# Patient Record
Sex: FEMALE | Race: WHITE | URBAN - METROPOLITAN AREA
[De-identification: names, ages, dates, MRNs, and addresses within clinical notes are randomized per-mention and may not be internally consistent; named-entity substitution may affect disease eponyms.]

---

## 2017-03-27 ENCOUNTER — OFFICE VISIT (OUTPATIENT)
Dept: RHEUMATOLOGY | Age: 32
End: 2017-03-27

## 2017-03-27 VITALS
DIASTOLIC BLOOD PRESSURE: 64 MMHG | SYSTOLIC BLOOD PRESSURE: 114 MMHG | TEMPERATURE: 98.4 F | BODY MASS INDEX: 33.31 KG/M2 | HEIGHT: 62 IN | WEIGHT: 181 LBS | HEART RATE: 67 BPM

## 2017-03-27 DIAGNOSIS — Z51.11 MAINTENANCE CHEMOTHERAPY: ICD-10-CM

## 2017-03-27 DIAGNOSIS — M06.09 RHEUMATOID ARTHRITIS OF MULTIPLE SITES WITHOUT RHEUMATOID FACTOR (HCC): Primary | ICD-10-CM

## 2017-03-27 DIAGNOSIS — M06.09 RHEUMATOID ARTHRITIS OF MULTIPLE SITES WITHOUT RHEUMATOID FACTOR (HCC): ICD-10-CM

## 2017-03-27 LAB
A/G RATIO: 1.4 (ref 1.1–2.2)
ALBUMIN SERPL-MCNC: 4.3 G/DL (ref 3.4–5)
ALP BLD-CCNC: 48 U/L (ref 40–129)
ALT SERPL-CCNC: 17 U/L (ref 10–40)
ANION GAP SERPL CALCULATED.3IONS-SCNC: 19 MMOL/L (ref 3–16)
AST SERPL-CCNC: 16 U/L (ref 15–37)
BASOPHILS ABSOLUTE: 0 K/UL (ref 0–0.2)
BASOPHILS RELATIVE PERCENT: 0.4 %
BILIRUB SERPL-MCNC: 0.3 MG/DL (ref 0–1)
BUN BLDV-MCNC: 14 MG/DL (ref 7–20)
C-REACTIVE PROTEIN: 10.1 MG/L (ref 0–5.1)
CALCIUM SERPL-MCNC: 9.7 MG/DL (ref 8.3–10.6)
CHLORIDE BLD-SCNC: 100 MMOL/L (ref 99–110)
CO2: 20 MMOL/L (ref 21–32)
CREAT SERPL-MCNC: 0.6 MG/DL (ref 0.6–1.1)
EOSINOPHILS ABSOLUTE: 0.1 K/UL (ref 0–0.6)
EOSINOPHILS RELATIVE PERCENT: 1.4 %
GFR AFRICAN AMERICAN: >60
GFR NON-AFRICAN AMERICAN: >60
GLOBULIN: 3 G/DL
GLUCOSE BLD-MCNC: 74 MG/DL (ref 70–99)
HCT VFR BLD CALC: 42.5 % (ref 36–48)
HEMOGLOBIN: 13.9 G/DL (ref 12–16)
HEPATITIS B CORE IGM ANTIBODY: NORMAL
HEPATITIS B SURFACE ANTIGEN INTERPRETATION: NORMAL
HEPATITIS C ANTIBODY INTERPRETATION: NORMAL
LYMPHOCYTES ABSOLUTE: 2.5 K/UL (ref 1–5.1)
LYMPHOCYTES RELATIVE PERCENT: 31.7 %
MCH RBC QN AUTO: 30.4 PG (ref 26–34)
MCHC RBC AUTO-ENTMCNC: 32.7 G/DL (ref 31–36)
MCV RBC AUTO: 93.2 FL (ref 80–100)
MONOCYTES ABSOLUTE: 0.2 K/UL (ref 0–1.3)
MONOCYTES RELATIVE PERCENT: 3.1 %
NEUTROPHILS ABSOLUTE: 5.1 K/UL (ref 1.7–7.7)
NEUTROPHILS RELATIVE PERCENT: 63.4 %
PDW BLD-RTO: 14.1 % (ref 12.4–15.4)
PLATELET # BLD: 329 K/UL (ref 135–450)
PMV BLD AUTO: 9.1 FL (ref 5–10.5)
POTASSIUM SERPL-SCNC: 4.3 MMOL/L (ref 3.5–5.1)
RBC # BLD: 4.56 M/UL (ref 4–5.2)
RHEUMATOID FACTOR: 23 IU/ML
SEDIMENTATION RATE, ERYTHROCYTE: 32 MM/HR (ref 0–20)
SODIUM BLD-SCNC: 139 MMOL/L (ref 136–145)
TOTAL PROTEIN: 7.3 G/DL (ref 6.4–8.2)
WBC # BLD: 8 K/UL (ref 4–11)

## 2017-03-27 PROCEDURE — 99215 OFFICE O/P EST HI 40 MIN: CPT | Performed by: INTERNAL MEDICINE

## 2017-03-29 LAB — CCP IGG ANTIBODIES: 135 UNITS (ref 0–19)

## 2017-03-31 LAB
QUANTIFERON (R) TB GOLD (INCUBATED): NEGATIVE
QUANTIFERON MITOGEN: 9.75 IU/ML
QUANTIFERON NIL: 0.06 IU/ML
QUANTIFERON TB AG MINUS NIL: 0 IU/ML (ref 0–0.34)

## 2017-07-07 ENCOUNTER — OFFICE VISIT (OUTPATIENT)
Dept: RHEUMATOLOGY | Age: 32
End: 2017-07-07

## 2017-07-07 VITALS
WEIGHT: 188 LBS | DIASTOLIC BLOOD PRESSURE: 76 MMHG | OXYGEN SATURATION: 98 % | HEART RATE: 88 BPM | BODY MASS INDEX: 34.39 KG/M2 | SYSTOLIC BLOOD PRESSURE: 128 MMHG

## 2017-07-07 DIAGNOSIS — Z51.11 MAINTENANCE CHEMOTHERAPY: ICD-10-CM

## 2017-07-07 DIAGNOSIS — M05.79 RHEUMATOID ARTHRITIS INVOLVING MULTIPLE SITES WITH POSITIVE RHEUMATOID FACTOR (HCC): ICD-10-CM

## 2017-07-07 DIAGNOSIS — M05.79 RHEUMATOID ARTHRITIS INVOLVING MULTIPLE SITES WITH POSITIVE RHEUMATOID FACTOR (HCC): Primary | ICD-10-CM

## 2017-07-07 LAB
A/G RATIO: 1.3 (ref 1.1–2.2)
ALBUMIN SERPL-MCNC: 4.3 G/DL (ref 3.4–5)
ALP BLD-CCNC: 50 U/L (ref 40–129)
ALT SERPL-CCNC: 13 U/L (ref 10–40)
ANION GAP SERPL CALCULATED.3IONS-SCNC: 15 MMOL/L (ref 3–16)
AST SERPL-CCNC: 16 U/L (ref 15–37)
BASOPHILS ABSOLUTE: 0 K/UL (ref 0–0.2)
BASOPHILS RELATIVE PERCENT: 0.4 %
BILIRUB SERPL-MCNC: 0.3 MG/DL (ref 0–1)
BUN BLDV-MCNC: 10 MG/DL (ref 7–20)
C-REACTIVE PROTEIN: 12.1 MG/L (ref 0–5.1)
CALCIUM SERPL-MCNC: 9.4 MG/DL (ref 8.3–10.6)
CHLORIDE BLD-SCNC: 100 MMOL/L (ref 99–110)
CO2: 24 MMOL/L (ref 21–32)
CREAT SERPL-MCNC: 0.7 MG/DL (ref 0.6–1.1)
EOSINOPHILS ABSOLUTE: 0.1 K/UL (ref 0–0.6)
EOSINOPHILS RELATIVE PERCENT: 1.1 %
GFR AFRICAN AMERICAN: >60
GFR NON-AFRICAN AMERICAN: >60
GLOBULIN: 3.2 G/DL
GLUCOSE BLD-MCNC: 98 MG/DL (ref 70–99)
HCT VFR BLD CALC: 44.4 % (ref 36–48)
HEMOGLOBIN: 14.8 G/DL (ref 12–16)
LYMPHOCYTES ABSOLUTE: 3 K/UL (ref 1–5.1)
LYMPHOCYTES RELATIVE PERCENT: 35 %
MCH RBC QN AUTO: 31.2 PG (ref 26–34)
MCHC RBC AUTO-ENTMCNC: 33.3 G/DL (ref 31–36)
MCV RBC AUTO: 93.7 FL (ref 80–100)
MONOCYTES ABSOLUTE: 0.5 K/UL (ref 0–1.3)
MONOCYTES RELATIVE PERCENT: 5.3 %
NEUTROPHILS ABSOLUTE: 5 K/UL (ref 1.7–7.7)
NEUTROPHILS RELATIVE PERCENT: 58.2 %
PDW BLD-RTO: 13.9 % (ref 12.4–15.4)
PLATELET # BLD: 330 K/UL (ref 135–450)
PMV BLD AUTO: 8.5 FL (ref 5–10.5)
POTASSIUM SERPL-SCNC: 4.1 MMOL/L (ref 3.5–5.1)
RBC # BLD: 4.74 M/UL (ref 4–5.2)
SEDIMENTATION RATE, ERYTHROCYTE: 19 MM/HR (ref 0–20)
SODIUM BLD-SCNC: 139 MMOL/L (ref 136–145)
TOTAL PROTEIN: 7.5 G/DL (ref 6.4–8.2)
WBC # BLD: 8.5 K/UL (ref 4–11)

## 2017-07-07 PROCEDURE — 99214 OFFICE O/P EST MOD 30 MIN: CPT | Performed by: INTERNAL MEDICINE

## 2017-07-07 RX ORDER — PREDNISONE 10 MG/1
TABLET ORAL
Qty: 21 TABLET | Refills: 1 | Status: SHIPPED | OUTPATIENT
Start: 2017-07-07 | End: 2017-07-07 | Stop reason: SDUPTHER

## 2017-07-07 RX ORDER — PREDNISONE 10 MG/1
TABLET ORAL
Qty: 21 TABLET | Refills: 1 | Status: SHIPPED | OUTPATIENT
Start: 2017-07-07 | End: 2018-01-19

## 2017-09-07 ENCOUNTER — TELEPHONE (OUTPATIENT)
Dept: INTERNAL MEDICINE CLINIC | Age: 32
End: 2017-09-07

## 2018-01-04 DIAGNOSIS — M06.09 RHEUMATOID ARTHRITIS OF MULTIPLE SITES WITHOUT RHEUMATOID FACTOR (HCC): ICD-10-CM

## 2018-01-04 DIAGNOSIS — M05.79 RHEUMATOID ARTHRITIS INVOLVING MULTIPLE SITES WITH POSITIVE RHEUMATOID FACTOR (HCC): ICD-10-CM

## 2018-01-04 NOTE — TELEPHONE ENCOUNTER
From: Jerica Figueroa  Sent: 1/4/2018 4:13 PM EST  Subject: Medication Renewal Request    Jerica Figueroa would like a refill of the following medications:  methotrexate (RHEUMATREX) 2.5 MG chemo tablet Tere Bui MD]    Preferred pharmacy: [address] [place] [address], [address] [facility] [phone] - F [phone]    Comment:      Medication renewals requested in this message routed separately:  naproxen (NAPROXEN) 500 MG EC tablet [Scotty Mueller MD]  hydroxychloroquine (PLAQUENIL) 200 MG tablet Antolin Wesley MD]  folic acid (FOLVITE) 1 MG tablet Antolin Wesley MD]

## 2018-01-05 RX ORDER — ADALIMUMAB 40MG/0.8ML
40 KIT SUBCUTANEOUS
Qty: 2 KIT | Refills: 5 | Status: SHIPPED | OUTPATIENT
Start: 2018-01-05 | End: 2018-01-19 | Stop reason: SDUPTHER

## 2018-01-05 RX ORDER — ADALIMUMAB 40MG/0.8ML
KIT SUBCUTANEOUS
Qty: 1 KIT | Refills: 0 | OUTPATIENT
Start: 2018-01-05

## 2018-01-11 ENCOUNTER — TELEPHONE (OUTPATIENT)
Dept: INTERNAL MEDICINE CLINIC | Age: 33
End: 2018-01-11

## 2018-01-19 ENCOUNTER — OFFICE VISIT (OUTPATIENT)
Dept: RHEUMATOLOGY | Age: 33
End: 2018-01-19

## 2018-01-19 VITALS
HEIGHT: 63 IN | BODY MASS INDEX: 33.13 KG/M2 | WEIGHT: 187 LBS | DIASTOLIC BLOOD PRESSURE: 80 MMHG | SYSTOLIC BLOOD PRESSURE: 120 MMHG

## 2018-01-19 DIAGNOSIS — M05.79 RHEUMATOID ARTHRITIS INVOLVING MULTIPLE SITES WITH POSITIVE RHEUMATOID FACTOR (HCC): Primary | ICD-10-CM

## 2018-01-19 DIAGNOSIS — Z51.11 MAINTENANCE CHEMOTHERAPY: ICD-10-CM

## 2018-01-19 LAB
BASOPHILS ABSOLUTE: 0 K/UL (ref 0–0.2)
BASOPHILS RELATIVE PERCENT: 0.2 %
EOSINOPHILS ABSOLUTE: 0.1 K/UL (ref 0–0.6)
EOSINOPHILS RELATIVE PERCENT: 1.2 %
HCT VFR BLD CALC: 38 % (ref 36–48)
HEMOGLOBIN: 12.8 G/DL (ref 12–16)
LYMPHOCYTES ABSOLUTE: 4.2 K/UL (ref 1–5.1)
LYMPHOCYTES RELATIVE PERCENT: 48.2 %
MCH RBC QN AUTO: 30.6 PG (ref 26–34)
MCHC RBC AUTO-ENTMCNC: 33.6 G/DL (ref 31–36)
MCV RBC AUTO: 91.1 FL (ref 80–100)
MONOCYTES ABSOLUTE: 0.4 K/UL (ref 0–1.3)
MONOCYTES RELATIVE PERCENT: 5.1 %
NEUTROPHILS ABSOLUTE: 3.9 K/UL (ref 1.7–7.7)
NEUTROPHILS RELATIVE PERCENT: 45.3 %
PDW BLD-RTO: 14.3 % (ref 12.4–15.4)
PLATELET # BLD: 296 K/UL (ref 135–450)
PMV BLD AUTO: 8.8 FL (ref 5–10.5)
RBC # BLD: 4.18 M/UL (ref 4–5.2)
WBC # BLD: 8.7 K/UL (ref 4–11)

## 2018-01-19 PROCEDURE — 99214 OFFICE O/P EST MOD 30 MIN: CPT | Performed by: INTERNAL MEDICINE

## 2018-01-19 RX ORDER — FOLIC ACID 1 MG/1
1 TABLET ORAL DAILY
Qty: 90 TABLET | Refills: 3 | Status: SHIPPED | OUTPATIENT
Start: 2018-01-19 | End: 2018-07-23 | Stop reason: SDUPTHER

## 2018-01-19 RX ORDER — ADALIMUMAB 40MG/0.8ML
40 KIT SUBCUTANEOUS
Qty: 2 KIT | Refills: 5 | Status: SHIPPED | OUTPATIENT
Start: 2018-01-19 | End: 2018-02-12 | Stop reason: SDUPTHER

## 2018-01-19 RX ORDER — HYDROXYCHLOROQUINE SULFATE 200 MG/1
200 TABLET, FILM COATED ORAL DAILY
Qty: 90 TABLET | Refills: 1 | Status: SHIPPED | OUTPATIENT
Start: 2018-01-19 | End: 2018-07-23 | Stop reason: SDUPTHER

## 2018-01-19 NOTE — PROGRESS NOTES
No history of heart burn, dysphagia or esophageal dysmotility. No inflammatory bowel disease. Genitourinary: No history renal disease, miscarriages. Hematologic/Lymphatic: No abnormal bruising or bleeding, blood clots or swollen lymph nodes. Neurological: No history seizure or focal weakness. No history of neuropathies, paresthesias or hyperesthesias, facial droop, diplopia  Psychiatric: No history of bipolar disease  Endocrine: Denies any thyroid / parathyroid disease and osteoporosis  Allergic/Immunologic: No nasal congestion or hives. I have reviewed patients Past medical History, Social History and Family History as mentioned in her chart and this remains unchanged from previous.     Past Medical History:   Diagnosis Date    Anemia     Endometriosis     IBS (irritable bowel syndrome)     Rheumatoid arthritis ([facility] [address]      Past Surgical History:   Procedure Laterality Date    LAPAROSCOPY      Endometriosis x2     Social History     Social History    Marital status:      Spouse name: N/A    Number of children: N/A    Years of education: N/A     Occupational History    teacher      Social History Main Topics    Smoking status: Never Smoker    Smokeless tobacco: Never Used    Alcohol use No    Drug use: No    Sexual activity: Not on file     Other Topics Concern    Not on file     Social History Narrative    No narrative on file     Family History   Problem Relation Age of Onset    Cancer Mother     Arthritis Mother     Lupus Mother     High Blood Pressure Father     Stroke Maternal Grandmother     Heart Disease Maternal Grandmother     Arthritis Maternal Grandmother     Heart Disease Maternal Grandfather     Heart Disease Paternal Grandmother          PHYSICAL EXAM   Vitals:    01/19/18 1533   BP: 120/80   Weight: 187 lb (84.8 kg)   Height: 5' 2.5\" (1.588 m)     Physical Exam  Constitutional:  Well developed, well nourished, no acute distress, non-toxic appearance MCV 93.7 07/07/2017     07/07/2017    LYMPHOPCT 35.0 07/07/2017    RBC 4.74 07/07/2017    MCH 31.2 07/07/2017    MCHC 33.3 07/07/2017    RDW 13.9 07/07/2017     Lab Results   Component Value Date    CREATININE 0.7 07/07/2017    BUN 10 07/07/2017     07/07/2017    K 4.1 07/07/2017     07/07/2017    CO2 24 07/07/2017     Lab Results   Component Value Date    ALT 13 07/07/2017    AST 16 07/07/2017    ALKPHOS 50 07/07/2017    BILITOT 0.3 07/07/2017       No images are attached to the encounter or orders placed in the encounter. ASSESSMENT:    1. Rheumatoid arthritis involving multiple sites with positive rheumatoid factor ([facility] [address]    2. Maintenance chemotherapy        PLAN:   1. Rheumatoid arthritis involving multiple sites with positive rheumatoid factor (HCC)  RF, CCP positive  -No synovitis on exam  -Continue methotrexate, Humira, Plaquenil  -Labs every 12 weeks  -Eye exam normal  - HUMIRA 40 MG/0.8ML injection; Inject 0.8 mLs into the skin every 14 days  Dispense: 2 kit; Refill: 5  - hydroxychloroquine (PLAQUENIL) 200 MG tablet; Take 1 tablet by mouth daily  Dispense: 90 tablet; Refill: 1  - folic acid (FOLVITE) 1 MG tablet; Take 1 tablet by mouth daily  Dispense: 90 tablet; Refill: 3  - methotrexate (RHEUMATREX) 2.5 MG chemo tablet; Take 8 tablets by mouth once a week Take by mouth once a week. Dispense: 96 tablet; Refill: 0    2. Maintenance chemotherapy  Labs every 12 weeks  - ALT; Standing  - AST; Standing  - CBC Auto Differential; Standing  - Creatinine, Serum; Standing    The patient indicates understanding of these issues and agrees with the plan. Return in about 3 months (around 4/19/2018). The risks and benefits of my recommendations, as well as other treatment options, benefits and side effects were discussed with the patient. All questions were answered.   ######################################################################    I thank you for giving me the opportunity to

## 2018-01-20 LAB
ALT SERPL-CCNC: 15 U/L (ref 10–40)
AST SERPL-CCNC: 13 U/L (ref 15–37)
CREAT SERPL-MCNC: 0.6 MG/DL (ref 0.6–1.1)
GFR AFRICAN AMERICAN: >60
GFR NON-AFRICAN AMERICAN: >60

## 2018-02-12 DIAGNOSIS — M05.79 RHEUMATOID ARTHRITIS INVOLVING MULTIPLE SITES WITH POSITIVE RHEUMATOID FACTOR (HCC): ICD-10-CM

## 2018-02-12 RX ORDER — ADALIMUMAB 40MG/0.8ML
40 KIT SUBCUTANEOUS
Qty: 2 KIT | Refills: 5 | Status: SHIPPED | OUTPATIENT
Start: 2018-02-12 | End: 2018-08-01 | Stop reason: SDUPTHER

## 2018-04-23 ENCOUNTER — OFFICE VISIT (OUTPATIENT)
Dept: RHEUMATOLOGY | Age: 33
End: 2018-04-23

## 2018-04-23 VITALS
BODY MASS INDEX: 33.45 KG/M2 | SYSTOLIC BLOOD PRESSURE: 118 MMHG | WEIGHT: 188.8 LBS | DIASTOLIC BLOOD PRESSURE: 78 MMHG | HEIGHT: 63 IN | HEART RATE: 67 BPM | OXYGEN SATURATION: 99 %

## 2018-04-23 DIAGNOSIS — M25.572 ACUTE LEFT ANKLE PAIN: ICD-10-CM

## 2018-04-23 DIAGNOSIS — Z51.11 MAINTENANCE CHEMOTHERAPY: ICD-10-CM

## 2018-04-23 DIAGNOSIS — M05.79 RHEUMATOID ARTHRITIS INVOLVING MULTIPLE SITES WITH POSITIVE RHEUMATOID FACTOR (HCC): Primary | ICD-10-CM

## 2018-04-23 LAB
ALT SERPL-CCNC: 22 U/L (ref 10–40)
AST SERPL-CCNC: 19 U/L (ref 15–37)
BASOPHILS ABSOLUTE: 0 K/UL (ref 0–0.2)
BASOPHILS RELATIVE PERCENT: 0.5 %
CREAT SERPL-MCNC: 0.6 MG/DL (ref 0.6–1.1)
EOSINOPHILS ABSOLUTE: 0.1 K/UL (ref 0–0.6)
EOSINOPHILS RELATIVE PERCENT: 1.3 %
GFR AFRICAN AMERICAN: >60
GFR NON-AFRICAN AMERICAN: >60
HCT VFR BLD CALC: 40.5 % (ref 36–48)
HEMOGLOBIN: 13.6 G/DL (ref 12–16)
LYMPHOCYTES ABSOLUTE: 3 K/UL (ref 1–5.1)
LYMPHOCYTES RELATIVE PERCENT: 32.3 %
MCH RBC QN AUTO: 31.5 PG (ref 26–34)
MCHC RBC AUTO-ENTMCNC: 33.5 G/DL (ref 31–36)
MCV RBC AUTO: 94.1 FL (ref 80–100)
MONOCYTES ABSOLUTE: 0.4 K/UL (ref 0–1.3)
MONOCYTES RELATIVE PERCENT: 4.1 %
NEUTROPHILS ABSOLUTE: 5.8 K/UL (ref 1.7–7.7)
NEUTROPHILS RELATIVE PERCENT: 61.8 %
PDW BLD-RTO: 14.2 % (ref 12.4–15.4)
PLATELET # BLD: 314 K/UL (ref 135–450)
PMV BLD AUTO: 9.5 FL (ref 5–10.5)
RBC # BLD: 4.31 M/UL (ref 4–5.2)
WBC # BLD: 9.4 K/UL (ref 4–11)

## 2018-04-23 PROCEDURE — 99214 OFFICE O/P EST MOD 30 MIN: CPT | Performed by: INTERNAL MEDICINE

## 2018-04-23 RX ORDER — FLUOXETINE HYDROCHLORIDE 20 MG/1
CAPSULE ORAL
COMMUNITY
Start: 2018-01-26 | End: 2021-07-30

## 2018-05-03 DIAGNOSIS — M05.79 RHEUMATOID ARTHRITIS INVOLVING MULTIPLE SITES WITH POSITIVE RHEUMATOID FACTOR (HCC): ICD-10-CM

## 2018-07-23 ENCOUNTER — OFFICE VISIT (OUTPATIENT)
Dept: RHEUMATOLOGY | Age: 33
End: 2018-07-23

## 2018-07-23 VITALS
WEIGHT: 193.4 LBS | HEART RATE: 64 BPM | BODY MASS INDEX: 35.59 KG/M2 | SYSTOLIC BLOOD PRESSURE: 136 MMHG | HEIGHT: 62 IN | DIASTOLIC BLOOD PRESSURE: 94 MMHG

## 2018-07-23 DIAGNOSIS — M25.572 ACUTE LEFT ANKLE PAIN: ICD-10-CM

## 2018-07-23 DIAGNOSIS — Z51.11 MAINTENANCE CHEMOTHERAPY: ICD-10-CM

## 2018-07-23 DIAGNOSIS — M05.79 RHEUMATOID ARTHRITIS INVOLVING MULTIPLE SITES WITH POSITIVE RHEUMATOID FACTOR (HCC): Primary | ICD-10-CM

## 2018-07-23 LAB
ALT SERPL-CCNC: 20 U/L (ref 10–40)
AST SERPL-CCNC: 15 U/L (ref 15–37)
BASOPHILS ABSOLUTE: 0 K/UL (ref 0–0.2)
BASOPHILS RELATIVE PERCENT: 0.4 %
CREAT SERPL-MCNC: 0.6 MG/DL (ref 0.6–1.1)
EOSINOPHILS ABSOLUTE: 0.1 K/UL (ref 0–0.6)
EOSINOPHILS RELATIVE PERCENT: 1 %
GFR AFRICAN AMERICAN: >60
GFR NON-AFRICAN AMERICAN: >60
HCT VFR BLD CALC: 40.1 % (ref 36–48)
HEMOGLOBIN: 13.5 G/DL (ref 12–16)
LYMPHOCYTES ABSOLUTE: 3.1 K/UL (ref 1–5.1)
LYMPHOCYTES RELATIVE PERCENT: 37.8 %
MCH RBC QN AUTO: 31.2 PG (ref 26–34)
MCHC RBC AUTO-ENTMCNC: 33.8 G/DL (ref 31–36)
MCV RBC AUTO: 92.4 FL (ref 80–100)
MONOCYTES ABSOLUTE: 0.4 K/UL (ref 0–1.3)
MONOCYTES RELATIVE PERCENT: 4.9 %
NEUTROPHILS ABSOLUTE: 4.5 K/UL (ref 1.7–7.7)
NEUTROPHILS RELATIVE PERCENT: 55.9 %
PDW BLD-RTO: 14.3 % (ref 12.4–15.4)
PLATELET # BLD: 328 K/UL (ref 135–450)
PMV BLD AUTO: 8.5 FL (ref 5–10.5)
RBC # BLD: 4.34 M/UL (ref 4–5.2)
WBC # BLD: 8.1 K/UL (ref 4–11)

## 2018-07-23 PROCEDURE — 99214 OFFICE O/P EST MOD 30 MIN: CPT | Performed by: INTERNAL MEDICINE

## 2018-07-23 RX ORDER — FOLIC ACID 1 MG/1
1 TABLET ORAL DAILY
Qty: 90 TABLET | Refills: 3 | Status: SHIPPED | OUTPATIENT
Start: 2018-07-23 | End: 2019-09-30 | Stop reason: SDUPTHER

## 2018-07-23 RX ORDER — HYDROXYCHLOROQUINE SULFATE 200 MG/1
200 TABLET, FILM COATED ORAL DAILY
Qty: 90 TABLET | Refills: 1 | Status: SHIPPED | OUTPATIENT
Start: 2018-07-23 | End: 2020-12-30

## 2018-07-23 NOTE — PROGRESS NOTES
FULL    MCP1  0  0  FULL   0  0  FULL    MCP2  0  0  FULL   0  0  FULL    MCP3  0  0  FULL   0  0  FULL    MCP4  0  0  FULL   0  0  FULL    MCP5  0  0  FULL   0  0  FULL    Wrist  0  0  FULL   0  0  FULL    Elbow  0  0  FULL   0  0  FULL    Shouldr  0  0  FULL   0  0  FULL    Hip  0  0  FULL   0  0  FULL    Knee  0  0   crepitus   0  0   crepitus    Ankle  0  0  FULL   0  0  FULL    MTP1  0  0  FULL   0  0  FULL    MTP2  0  0  FULL   0  0  FULL    MTP3  0  0  FULL   0  0  FULL    MTP4  0  0  FULL   0  0  FULL    MTP5  0  0  FULL   0  0  FULL    IP1  0  0  FULL   0  0  FULL    IP2  0  0  FULL   0  0  FULL    IP3  0  0  FULL   0  0  FULL    IP4  0  0  FULL   0  0  FULL    IP5  0  0  FULL   0 0 FULL     Ambulates without assistance, normal gait  Back- no tenderness. Eyes:  PERRL, extra ocular movements intact, conjunctiva normal   HEENT:  Atraumatic, normocephalic, external ears normal, oropharynx moist, no pharyngeal exudates. Respiratory:  No respiratory distress  GI:  Soft, nondistended, normal bowel sounds, nontender, no organomegaly, no mass, no rebound, no guarding   :  No costovertebral angle tenderness   Integument:  Well hydrated, no rash or telangiectasias  Lymphatic:  No lymphadenopathy noted   Neurologic:   Alert & oriented x 3, CN 2-12 normal, no focal deficits noted. Sensations Intact. Muscle strength 5/5 proximally and distally in upper and lower extremities.    Psychiatric:  Speech and behavior appropriate           LABS AND IMAGING    Lab Results   Component Value Date    WBC 9.4 04/23/2018    HGB 13.6 04/23/2018    HCT 40.5 04/23/2018    MCV 94.1 04/23/2018     04/23/2018    LYMPHOPCT 32.3 04/23/2018    RBC 4.31 04/23/2018    MCH 31.5 04/23/2018    MCHC 33.5 04/23/2018    RDW 14.2 04/23/2018     Lab Results   Component Value Date    CREATININE 0.6 04/23/2018    BUN 10 07/07/2017     07/07/2017    K 4.1 07/07/2017     07/07/2017    CO2 24 07/07/2017     Lab Results   Component Value Date    ALT 22 04/23/2018    AST 19 04/23/2018    ALKPHOS 50 07/07/2017    BILITOT 0.3 07/07/2017       No images are attached to the encounter or orders placed in the encounter. ASSESSMENT:    1. Rheumatoid arthritis involving multiple sites with positive rheumatoid factor ([facility] [address]    2. Maintenance chemotherapy    3. Acute left ankle pain        PLAN:   1. Rheumatoid arthritis involving multiple sites with positive rheumatoid factor (HCC)  RF, CCP positive  -No synovitis on exam  -Continue methotrexate, Humira, Plaquenil  -Labs every 12 weeks  -Up-to-date with eye exam    - methotrexate (RHEUMATREX) 2.5 MG chemo tablet; TAKE 8 TABLETS ONCE A WEEK  Dispense: 96 tablet; Refill: 0  - hydroxychloroquine (PLAQUENIL) 200 MG tablet; Take 1 tablet by mouth daily  Dispense: 90 tablet; Refill: 1  - folic acid (FOLVITE) 1 MG tablet; Take 1 tablet by mouth daily  Dispense: 90 tablet; Refill: 3    2. Maintenance chemotherapy  Labs reviewed  - ALT  - AST  - CBC Auto Differential  - Creatinine, Serum    3. Acute left ankle pain  Improved. Continue ice, activity modification and ACE bandage     The patient indicates understanding of these issues and agrees with the plan. Return in about 3 months (around 10/23/2018). The risks and benefits of my recommendations, as well as other treatment options, benefits and side effects were discussed with the patient. All questions were answered. ######################################################################    I thank you for giving me the opportunity to participate in [facility]. If you have any questions or concerns please feel free to contact me. I look forward to following  Mala Ruiz along with you. Electronically signed by: Raleigh Guillen MD, 7/23/2018 12:18 PM    Documentation was done using voice recognition dragon software.   Every effort was made to ensure accuracy; however, inadvertent unintentional computerized transcription errors may be present.

## 2018-08-01 DIAGNOSIS — M05.79 RHEUMATOID ARTHRITIS INVOLVING MULTIPLE SITES WITH POSITIVE RHEUMATOID FACTOR (HCC): ICD-10-CM

## 2018-08-01 RX ORDER — ADALIMUMAB 40MG/0.8ML
40 KIT SUBCUTANEOUS
Qty: 2 KIT | Refills: 5 | Status: SHIPPED | OUTPATIENT
Start: 2018-08-01 | End: 2018-08-23 | Stop reason: SDUPTHER

## 2018-08-07 ENCOUNTER — TELEPHONE (OUTPATIENT)
Dept: INTERNAL MEDICINE CLINIC | Age: 33
End: 2018-08-07

## 2018-08-15 ENCOUNTER — TELEPHONE (OUTPATIENT)
Dept: INTERNAL MEDICINE CLINIC | Age: 33
End: 2018-08-15

## 2018-08-15 NOTE — TELEPHONE ENCOUNTER
Patient states that her script for Humira prefilled syringes have been sent to [place] twice instead of [facility]. she is asking if script can be sent to right pharmacy. She is due to take injection this Sunday. Aware that Madalyn Diaz is out this week. Asking if  could refill.

## 2018-08-21 ENCOUNTER — TELEPHONE (OUTPATIENT)
Dept: INTERNAL MEDICINE CLINIC | Age: 33
End: 2018-08-21

## 2018-08-23 DIAGNOSIS — M05.79 RHEUMATOID ARTHRITIS INVOLVING MULTIPLE SITES WITH POSITIVE RHEUMATOID FACTOR (HCC): ICD-10-CM

## 2018-08-23 RX ORDER — ADALIMUMAB 40MG/0.8ML
40 KIT SUBCUTANEOUS
Qty: 6 KIT | Refills: 1 | Status: SHIPPED | OUTPATIENT
Start: 2018-08-23 | End: 2018-12-17 | Stop reason: SDUPTHER

## 2018-12-13 ENCOUNTER — TELEPHONE (OUTPATIENT)
Dept: RHEUMATOLOGY | Age: 33
End: 2018-12-13

## 2018-12-13 NOTE — TELEPHONE ENCOUNTER
PA submitted for Humira 40MG/0.8ML SC PSKT on CMM  Key: [id number] - PA Case ID: [id number]    Pending review

## 2018-12-17 ENCOUNTER — OFFICE VISIT (OUTPATIENT)
Dept: RHEUMATOLOGY | Age: 33
End: 2018-12-17
Payer: COMMERCIAL

## 2018-12-17 VITALS
DIASTOLIC BLOOD PRESSURE: 67 MMHG | HEART RATE: 74 BPM | WEIGHT: 191 LBS | TEMPERATURE: 98.5 F | SYSTOLIC BLOOD PRESSURE: 102 MMHG | HEIGHT: 62 IN | BODY MASS INDEX: 35.15 KG/M2

## 2018-12-17 DIAGNOSIS — M05.79 RHEUMATOID ARTHRITIS INVOLVING MULTIPLE SITES WITH POSITIVE RHEUMATOID FACTOR (HCC): Primary | ICD-10-CM

## 2018-12-17 DIAGNOSIS — Z51.11 MAINTENANCE CHEMOTHERAPY: ICD-10-CM

## 2018-12-17 LAB
ALT SERPL-CCNC: 22 U/L (ref 10–40)
AST SERPL-CCNC: 15 U/L (ref 15–37)
BASOPHILS ABSOLUTE: 0 K/UL (ref 0–0.2)
BASOPHILS RELATIVE PERCENT: 0.4 %
CREAT SERPL-MCNC: 0.6 MG/DL (ref 0.6–1.1)
EOSINOPHILS ABSOLUTE: 0.1 K/UL (ref 0–0.6)
EOSINOPHILS RELATIVE PERCENT: 1.2 %
GFR AFRICAN AMERICAN: >60
GFR NON-AFRICAN AMERICAN: >60
HCT VFR BLD CALC: 41 % (ref 36–48)
HEMOGLOBIN: 13.7 G/DL (ref 12–16)
LYMPHOCYTES ABSOLUTE: 3.5 K/UL (ref 1–5.1)
LYMPHOCYTES RELATIVE PERCENT: 30 %
MCH RBC QN AUTO: 29.6 PG (ref 26–34)
MCHC RBC AUTO-ENTMCNC: 33.5 G/DL (ref 31–36)
MCV RBC AUTO: 88.5 FL (ref 80–100)
MONOCYTES ABSOLUTE: 0.5 K/UL (ref 0–1.3)
MONOCYTES RELATIVE PERCENT: 3.9 %
NEUTROPHILS ABSOLUTE: 7.4 K/UL (ref 1.7–7.7)
NEUTROPHILS RELATIVE PERCENT: 64.5 %
PDW BLD-RTO: 13.6 % (ref 12.4–15.4)
PLATELET # BLD: 312 K/UL (ref 135–450)
PMV BLD AUTO: 8.7 FL (ref 5–10.5)
RBC # BLD: 4.64 M/UL (ref 4–5.2)
WBC # BLD: 11.5 K/UL (ref 4–11)

## 2018-12-17 PROCEDURE — 99214 OFFICE O/P EST MOD 30 MIN: CPT | Performed by: INTERNAL MEDICINE

## 2018-12-17 RX ORDER — ADALIMUMAB 40MG/0.8ML
40 KIT SUBCUTANEOUS
Qty: 6 KIT | Refills: 1 | Status: SHIPPED | OUTPATIENT
Start: 2018-12-17 | End: 2019-04-15 | Stop reason: SDUPTHER

## 2018-12-17 RX ORDER — CELECOXIB 100 MG/1
100 CAPSULE ORAL 2 TIMES DAILY
Qty: 60 CAPSULE | Refills: 3 | Status: SHIPPED | OUTPATIENT
Start: 2018-12-17 | End: 2019-10-15

## 2018-12-17 RX ORDER — CYANOCOBALAMIN 1000 UG/ML
INJECTION INTRAMUSCULAR; SUBCUTANEOUS
COMMUNITY
Start: 2018-11-26 | End: 2021-03-31

## 2018-12-17 NOTE — PROGRESS NOTES
minutes. She feels that her symptoms have recently worsened. Her pain and swelling last for few hours. She denies any recent fevers or infections. She is able to perform her ADLs. Interim History: She presents for follow-up of rheumatoid arthritis. She is on methotrexate, Humira and Plaquenil. She is complaining of pain in the right fourth PIP and left second and fourth PIP joint with occasional swelling. She has a morning stiffness lasting for 40 minutes to 1 hour She denies any other joint pain or swelling. Symptoms got worse after she went off of naproxen due to nausea and vomiting. She denies any recent fevers or infections. She denies any side effects with medications. Blood work reviewed shows positive RF, CCP, elevated ESR and CRP. She is noncompliant with blood work. HPI  ROS      REVIEW OF SYSTEMS: Dry eyes+, IBS, ANXIETY AND DEPRESSION   Constitutional: No unanticipated weight loss or fevers. No fatigue and malaise. Integumentary: No rash, photosensitivity, malar rash, livedo reticularis, alopecia and Raynaud's symptoms, sclerodactyly, skin tightening  Eyes: negative for  visual disturbance and persistent redness, discharge from eyes   ENT: - No tinnitus, loss of hearing, vertigo, or recurrent ear infections.  - No history of nasal/oral ulcers. Cardiovascular: No history of pericarditis, chest pain or murmur or palpitations  Respiratory: No shortness of breath, cough or history of interstitial lung disease. No history of pleurisy. No history of tuberculosis or atypical infections. Gastrointestinal: No history of heart burn, dysphagia or esophageal dysmotility. No inflammatory bowel disease. Genitourinary: No history renal disease, miscarriages. Hematologic/Lymphatic: No abnormal bruising or bleeding, blood clots or swollen lymph nodes. Neurological: No history seizure or focal weakness.  No history of neuropathies, paresthesias or hyperesthesias, facial droop, diplopia  Psychiatric: No history of bipolar disease  Endocrine: Denies any thyroid / parathyroid disease and osteoporosis  Allergic/Immunologic: No nasal congestion or hives. I have reviewed patients Past medical History, Social History and Family History as mentioned in her chart and this remains unchanged from previous.     Past Medical History:   Diagnosis Date    Anemia     Endometriosis     IBS (irritable bowel syndrome)     Rheumatoid arthritis (HCC)      Past Surgical History:   Procedure Laterality Date    LAPAROSCOPY      Endometriosis x2     Social History     Social History    Marital status:      Spouse name: N/A    Number of children: N/A    Years of education: N/A     Occupational History    teacher      Social History Main Topics    Smoking status: Never Smoker    Smokeless tobacco: Never Used    Alcohol use No    Drug use: No    Sexual activity: Not on file     Other Topics Concern    Not on file     Social History Narrative    No narrative on file     Family History   Problem Relation Age of Onset    Cancer Mother     Arthritis Mother     Lupus Mother     High Blood Pressure Father     Stroke Maternal Grandmother     Heart Disease Maternal Grandmother     Arthritis Maternal Grandmother     Heart Disease Maternal Grandfather     Heart Disease Paternal Grandmother          PHYSICAL EXAM   Vitals:    12/17/18 1524   BP: 102/67   Site: Left Upper Arm   Pulse: 74   Temp: 98.5 °F (36.9 °C)   Weight: 191 lb (86.6 kg)   Height: 5' 2\" (1.575 m)     Physical Exam  Constitutional:  Well developed, well nourished, no acute distress, non-toxic appearance   Musculoskeletal:    RIGHT  Swell  Tender  ROM  LEFT  Swell  Tender  ROM    DIP2  0  0  FULL   0  0  FULL    DIP3  0  0  FULL   0  0  FULL    DIP4  0  0  FULL   0  0  FULL    DIP5  0  0  FULL   0  0  FULL    PIP1  0  0  FULL   0  + FULL    PIP2  0  0  FULL   0  0  FULL    PIP3  0  0  FULL   0  0  FULL    PIP4  0  0  FULL   0  + FULL    PIP5  0  0

## 2019-04-01 ENCOUNTER — OFFICE VISIT (OUTPATIENT)
Dept: RHEUMATOLOGY | Age: 34
End: 2019-04-01
Payer: COMMERCIAL

## 2019-04-01 VITALS
SYSTOLIC BLOOD PRESSURE: 119 MMHG | DIASTOLIC BLOOD PRESSURE: 60 MMHG | HEART RATE: 79 BPM | HEIGHT: 62 IN | WEIGHT: 189 LBS | BODY MASS INDEX: 34.78 KG/M2

## 2019-04-01 DIAGNOSIS — Z51.11 MAINTENANCE CHEMOTHERAPY: ICD-10-CM

## 2019-04-01 DIAGNOSIS — M05.79 RHEUMATOID ARTHRITIS INVOLVING MULTIPLE SITES WITH POSITIVE RHEUMATOID FACTOR (HCC): Primary | ICD-10-CM

## 2019-04-01 DIAGNOSIS — R22.42 SKIN LUMP OF LEG, LEFT: ICD-10-CM

## 2019-04-01 LAB
ALT SERPL-CCNC: 17 U/L (ref 10–40)
AST SERPL-CCNC: 15 U/L (ref 15–37)
BASOPHILS ABSOLUTE: 0 K/UL (ref 0–0.2)
BASOPHILS RELATIVE PERCENT: 0.3 %
CREAT SERPL-MCNC: 0.6 MG/DL (ref 0.6–1.1)
EOSINOPHILS ABSOLUTE: 0.1 K/UL (ref 0–0.6)
EOSINOPHILS RELATIVE PERCENT: 1.3 %
GFR AFRICAN AMERICAN: >60
GFR NON-AFRICAN AMERICAN: >60
HCT VFR BLD CALC: 38.8 % (ref 36–48)
HEMOGLOBIN: 13 G/DL (ref 12–16)
LYMPHOCYTES ABSOLUTE: 3.6 K/UL (ref 1–5.1)
LYMPHOCYTES RELATIVE PERCENT: 35.2 %
MCH RBC QN AUTO: 29.5 PG (ref 26–34)
MCHC RBC AUTO-ENTMCNC: 33.4 G/DL (ref 31–36)
MCV RBC AUTO: 88.4 FL (ref 80–100)
MONOCYTES ABSOLUTE: 0.5 K/UL (ref 0–1.3)
MONOCYTES RELATIVE PERCENT: 5.1 %
NEUTROPHILS ABSOLUTE: 6 K/UL (ref 1.7–7.7)
NEUTROPHILS RELATIVE PERCENT: 58.1 %
PDW BLD-RTO: 13.8 % (ref 12.4–15.4)
PLATELET # BLD: 309 K/UL (ref 135–450)
PMV BLD AUTO: 8.9 FL (ref 5–10.5)
RBC # BLD: 4.39 M/UL (ref 4–5.2)
WBC # BLD: 10.2 K/UL (ref 4–11)

## 2019-04-01 PROCEDURE — 99214 OFFICE O/P EST MOD 30 MIN: CPT | Performed by: INTERNAL MEDICINE

## 2019-04-01 NOTE — PROGRESS NOTES
2019  Patient Name: Pepper Wise  : 1985  Medical Record: [id number]    MEDICATIONS  Current Outpatient Medications   Medication Sig Dispense Refill    cyanocobalamin 1000 MCG/ML injection       celecoxib (CELEBREX) 100 MG capsule Take 1 capsule by mouth 2 times daily 60 capsule 3    HUMIRA 40 MG/0.8ML injection Inject 0.8 mLs into the skin every 7 days 6 kit 1    methotrexate (RHEUMATREX) 2.5 MG chemo tablet TAKE 8 TABLETS ONCE A WEEK 96 tablet 0    hydroxychloroquine (PLAQUENIL) 200 MG tablet Take 1 tablet by mouth daily 90 tablet 1    folic acid (FOLVITE) 1 MG tablet Take 1 tablet by mouth daily 90 tablet 3    FLUoxetine (PROZAC) 20 MG capsule       vitamin D (CHOLECALCIFEROL) 1000 UNIT TABS tablet Take 1,000 Units by mouth daily.  Multiple Vitamins-Minerals (THERAPEUTIC MULTIVITAMIN-MINERALS) tablet Take 1 tablet by mouth daily.  Levonorgest-Eth Estrad 91-Day (CAMRESE) 0.15-0.03 &0.01 MG TABS Take  by mouth.  acetaminophen (TYLENOL) 325 MG tablet Take 650 mg by mouth every 6 hours as needed for Pain. No current facility-administered medications for this visit. ALLERGIES  Allergies   Allergen Reactions    Naproxen Nausea And Vomiting    Ampicillin     Metoclopramide Hives    Pcn [Penicillins]          Comments  No specialty comments available. Background history:  Pepper Wise is a 34 y.o. female who is being seen for follow up evaluation of  rheumatoid arthritis. Patient is a transfer from Dr. Jaiden Chacon. Dr. Brandie James notes were reviewed in detail. She was diagnosed with rheumatoid arthritis 5 years ago. She was on Nukona which worked initially but lost its efficacy. She is also on methotrexate 8 pills per week, Plaquenil 200 mg once a day. She is on Humira 40 mg every week for past 1.5 years. She is complaining of intermittent pain and swelling in her knuckles, hips, neck and back. She has a morning stiffness lasting for 20 minutes to 45 minutes. She feels that her symptoms have recently worsened. Her pain and swelling last for few hours. She denies any recent fevers or infections. She is able to perform her ADLs. Interim History: She presents for follow-up of rheumatoid arthritis. She is on methotrexate, Humira and Plaquenil. She was placed on Celebrex 100 mg twice a day 3 months ago and has noticed significant improvement in joint pain, swelling and stiffness. Morning stiffness lasts for few minutes. She has felt a lump in the left lower extremity for past few days, it's tender to touch. She denies fever, weight loss or night sweats. She scheduled for ultrasound. She denies any recent fevers or infections. She denies any side effects with medications. Blood work reviewed shows positive RF, CCP, elevated ESR and CRP. HPI  ROS      REVIEW OF SYSTEMS: Dry eyes+, IBS, ANXIETY AND DEPRESSION   Constitutional: No unanticipated weight loss or fevers. No fatigue and malaise. Integumentary: No rash, photosensitivity, malar rash, livedo reticularis, alopecia and Raynaud's symptoms, sclerodactyly, skin tightening  Eyes: negative for  visual disturbance and persistent redness, discharge from eyes   ENT: - No tinnitus, loss of hearing, vertigo, or recurrent ear infections.  - No history of nasal/oral ulcers. Cardiovascular: No history of pericarditis, chest pain or murmur or palpitations  Respiratory: No shortness of breath, cough or history of interstitial lung disease. No history of pleurisy. No history of tuberculosis or atypical infections. Gastrointestinal: No history of heart burn, dysphagia or esophageal dysmotility. No inflammatory bowel disease. Genitourinary: No history renal disease, miscarriages. Hematologic/Lymphatic: No abnormal bruising or bleeding, blood clots or swollen lymph nodes. Neurological: No history seizure or focal weakness.  No history of neuropathies, paresthesias or hyperesthesias, facial droop, diplopia  Psychiatric: No history of bipolar disease  Endocrine: Denies any thyroid / parathyroid disease and osteoporosis  Allergic/Immunologic: No nasal congestion or hives. I have reviewed patients Past medical History, Social History and Family History as mentioned in her chart and this remains unchanged from previous.     Past Medical History:   Diagnosis Date    Anemia     Endometriosis     IBS (irritable bowel syndrome)     Rheumatoid arthritis (HCC)      Past Surgical History:   Procedure Laterality Date    LAPAROSCOPY      Endometriosis x2     Social History     Socioeconomic History    Marital status:      Spouse name: Not on file    Number of children: Not on file    Years of education: Not on file    Highest education level: Not on file   Occupational History    Occupation: teacher   Social Needs    Financial resource strain: Not on file    Food insecurity:     Worry: Not on file     Inability: Not on file    Transportation needs:     Medical: Not on file     Non-medical: Not on file   Tobacco Use    Smoking status: Never Smoker    Smokeless tobacco: Never Used   Substance and Sexual Activity    Alcohol use: No     Alcohol/week: 1.2 oz     Types: 2 Cans of beer per week    Drug use: No    Sexual activity: Not on file   Lifestyle    Physical activity:     Days per week: Not on file     Minutes per session: Not on file    Stress: Not on file   Relationships    Social connections:     Talks on phone: Not on file     Gets together: Not on file     Attends Yazdanism service: Not on file     Active member of club or organization: Not on file     Attends meetings of clubs or organizations: Not on file     Relationship status: Not on file    Intimate partner violence:     Fear of current or ex partner: Not on file     Emotionally abused: Not on file     Physically abused: Not on file     Forced sexual activity: Not on file   Other Topics Concern    Not on file   Social History Narrative    Not on

## 2019-04-08 ENCOUNTER — TELEPHONE (OUTPATIENT)
Dept: RHEUMATOLOGY | Age: 34
End: 2019-04-08

## 2019-04-15 ENCOUNTER — TELEPHONE (OUTPATIENT)
Dept: INTERNAL MEDICINE CLINIC | Age: 34
End: 2019-04-15

## 2019-04-15 DIAGNOSIS — M05.79 RHEUMATOID ARTHRITIS INVOLVING MULTIPLE SITES WITH POSITIVE RHEUMATOID FACTOR (HCC): ICD-10-CM

## 2019-04-15 RX ORDER — ADALIMUMAB 40MG/0.8ML
40 KIT SUBCUTANEOUS
Qty: 6 KIT | Refills: 1 | Status: SHIPPED | OUTPATIENT
Start: 2019-04-15 | End: 2019-07-08

## 2019-07-08 ENCOUNTER — TELEPHONE (OUTPATIENT)
Dept: RHEUMATOLOGY | Age: 34
End: 2019-07-08

## 2019-07-08 ENCOUNTER — OFFICE VISIT (OUTPATIENT)
Dept: RHEUMATOLOGY | Age: 34
End: 2019-07-08
Payer: COMMERCIAL

## 2019-07-08 VITALS
WEIGHT: 193 LBS | SYSTOLIC BLOOD PRESSURE: 124 MMHG | DIASTOLIC BLOOD PRESSURE: 63 MMHG | HEART RATE: 61 BPM | HEIGHT: 62 IN | BODY MASS INDEX: 35.51 KG/M2

## 2019-07-08 DIAGNOSIS — Z51.11 MAINTENANCE CHEMOTHERAPY: ICD-10-CM

## 2019-07-08 DIAGNOSIS — M05.79 RHEUMATOID ARTHRITIS INVOLVING MULTIPLE SITES WITH POSITIVE RHEUMATOID FACTOR (HCC): ICD-10-CM

## 2019-07-08 LAB
BASOPHILS ABSOLUTE: 0 K/UL (ref 0–0.2)
BASOPHILS RELATIVE PERCENT: 0.4 %
EOSINOPHILS ABSOLUTE: 0.2 K/UL (ref 0–0.6)
EOSINOPHILS RELATIVE PERCENT: 3 %
HCT VFR BLD CALC: 42.4 % (ref 36–48)
HEMOGLOBIN: 14.1 G/DL (ref 12–16)
LYMPHOCYTES ABSOLUTE: 2.5 K/UL (ref 1–5.1)
LYMPHOCYTES RELATIVE PERCENT: 34.6 %
MCH RBC QN AUTO: 30.6 PG (ref 26–34)
MCHC RBC AUTO-ENTMCNC: 33.4 G/DL (ref 31–36)
MCV RBC AUTO: 91.5 FL (ref 80–100)
MONOCYTES ABSOLUTE: 0.4 K/UL (ref 0–1.3)
MONOCYTES RELATIVE PERCENT: 5.7 %
NEUTROPHILS ABSOLUTE: 4.1 K/UL (ref 1.7–7.7)
NEUTROPHILS RELATIVE PERCENT: 56.3 %
PDW BLD-RTO: 13.3 % (ref 12.4–15.4)
PLATELET # BLD: 292 K/UL (ref 135–450)
PMV BLD AUTO: 8.1 FL (ref 5–10.5)
RBC # BLD: 4.63 M/UL (ref 4–5.2)
WBC # BLD: 7.3 K/UL (ref 4–11)

## 2019-07-08 PROCEDURE — 99213 OFFICE O/P EST LOW 20 MIN: CPT | Performed by: INTERNAL MEDICINE

## 2019-07-08 NOTE — PROGRESS NOTES
2019  Patient Name: Flakita Cole  : 1985  Medical Record: [id number]    MEDICATIONS  Current Outpatient Medications   Medication Sig Dispense Refill    Adalimumab (HUMIRA) 40 MG/0.4ML PSKT Inject 40 mg into the skin every 14 days 2 each 5    cyanocobalamin 1000 MCG/ML injection       celecoxib (CELEBREX) 100 MG capsule Take 1 capsule by mouth 2 times daily 60 capsule 3    methotrexate (RHEUMATREX) 2.5 MG chemo tablet TAKE 8 TABLETS ONCE A WEEK 96 tablet 0    hydroxychloroquine (PLAQUENIL) 200 MG tablet Take 1 tablet by mouth daily 90 tablet 1    folic acid (FOLVITE) 1 MG tablet Take 1 tablet by mouth daily 90 tablet 3    FLUoxetine (PROZAC) 20 MG capsule       vitamin D (CHOLECALCIFEROL) 1000 UNIT TABS tablet Take 1,000 Units by mouth daily.  Multiple Vitamins-Minerals (THERAPEUTIC MULTIVITAMIN-MINERALS) tablet Take 1 tablet by mouth daily.  Levonorgest-Eth Estrad 91-Day (CAMRESE) 0.15-0.03 &0.01 MG TABS Take  by mouth.  acetaminophen (TYLENOL) 325 MG tablet Take 650 mg by mouth every 6 hours as needed for Pain. No current facility-administered medications for this visit. ALLERGIES  Allergies   Allergen Reactions    Naproxen Nausea And Vomiting    Ampicillin     Metoclopramide Hives    Pcn [Penicillins]          Comments  No specialty comments available. Background history:  Flakita Cole is a 34 y.o. female who is being seen for follow up evaluation of  rheumatoid arthritis. Patient is a transfer from Dr. Hany Owens. Dr. Sydney Kemp notes were reviewed in detail. She was diagnosed with rheumatoid arthritis 5 years ago. She was on [employer] which worked initially but lost its efficacy. She is also on methotrexate 8 pills per week, Plaquenil 200 mg once a day. She is on Humira 40 mg every week for past 1.5 years. She is complaining of intermittent pain and swelling in her knuckles, hips, neck and back.   She has a morning stiffness lasting for 20 minutes to 45 minutes. She feels that her symptoms have recently worsened. Her pain and swelling last for few hours. She denies any recent fevers or infections. She is able to perform her ADLs. Interim History: She presents for follow-up of rheumatoid arthritis. She is on methotrexate, Humira and Plaquenil. She denies joint pain, swelling or stiffness. She is also on Celebrex 100 mg twice a day. She denies any side effects with meds. She denies any recent fevers or infections. She denies any side effects with medications. Blood work reviewed shows positive RF, CCP, elevated ESR and CRP. HPI  ROS      REVIEW OF SYSTEMS: Dry eyes+, IBS, ANXIETY AND DEPRESSION   Constitutional: No unanticipated weight loss or fevers. No fatigue and malaise. Integumentary: No rash, photosensitivity, malar rash, livedo reticularis, alopecia and Raynaud's symptoms, sclerodactyly, skin tightening  Eyes: negative for  visual disturbance and persistent redness, discharge from eyes   ENT: - No tinnitus, loss of hearing, vertigo, or recurrent ear infections.  - No history of nasal/oral ulcers. Cardiovascular: No history of pericarditis, chest pain or murmur or palpitations  Respiratory: No shortness of breath, cough or history of interstitial lung disease. No history of pleurisy. No history of tuberculosis or atypical infections. Gastrointestinal: No history of heart burn, dysphagia or esophageal dysmotility. No inflammatory bowel disease. Genitourinary: No history renal disease, miscarriages. Hematologic/Lymphatic: No abnormal bruising or bleeding, blood clots or swollen lymph nodes. Neurological: No history seizure or focal weakness. No history of neuropathies, paresthesias or hyperesthesias, facial droop, diplopia  Psychiatric: No history of bipolar disease  Endocrine: Denies any thyroid / parathyroid disease and osteoporosis  Allergic/Immunologic: No nasal congestion or hives.         I have reviewed patients Past medical History, telangiectasias  Lymphatic:  No lymphadenopathy noted   Neurologic:   Alert & oriented x 3, CN 2-12 normal, no focal deficits noted. Sensations Intact. Muscle strength 5/5 proximally and distally in upper and lower extremities. Psychiatric:  Speech and behavior appropriate           LABS AND IMAGING    Lab Results   Component Value Date    WBC 10.2 04/01/2019    HGB 13.0 04/01/2019    HCT 38.8 04/01/2019    MCV 88.4 04/01/2019     04/01/2019    LYMPHOPCT 35.2 04/01/2019    RBC 4.39 04/01/2019    MCH 29.5 04/01/2019    MCHC 33.4 04/01/2019    RDW 13.8 04/01/2019     Lab Results   Component Value Date    CREATININE 0.6 04/01/2019    BUN 10 07/07/2017     07/07/2017    K 4.1 07/07/2017     07/07/2017    CO2 24 07/07/2017     Lab Results   Component Value Date    ALT 17 04/01/2019    AST 15 04/01/2019    ALKPHOS 50 07/07/2017    BILITOT 0.3 07/07/2017       No images are attached to the encounter or orders placed in the encounter. ASSESSMENT:    1. Rheumatoid arthritis involving multiple sites with positive rheumatoid factor ([facility] [address]    2. Maintenance chemotherapy        PLAN:   1. Rheumatoid arthritis involving multiple sites with positive rheumatoid factor (HCC)  RF, CCP positive  -I do not appreciate any synovitis on joint exam  -Continue Celebrex 100 mg twice a day  -Continue Humira, methotrexate and Plaquenil      2. Maintenance chemotherapy  Declines flu and pneumonia vaccine  - ALT; Standing  - AST; Standing  - CBC Auto Differential; Standing  - Creatinine, Serum; Standing      The patient indicates understanding of these issues and agrees with the plan. Return in about 3 months (around 10/8/2019). The risks and benefits of my recommendations, as well as other treatment options, benefits and side effects were discussed with the patient. All questions were answered.   ######################################################################    I thank you for giving me the opportunity to participate in [facility]. If you have any questions or concerns please feel free to contact me. I look forward to following  Karley Davies along with you. Electronically signed by: Kedar Jama MD, 7/8/2019 12:09 PM    Documentation was done using voice recognition dragon software. Every effort was made to ensure accuracy; however, inadvertent unintentional computerized transcription errors may be present.

## 2019-07-09 ENCOUNTER — TELEPHONE (OUTPATIENT)
Dept: RHEUMATOLOGY | Age: 34
End: 2019-07-09

## 2019-07-09 DIAGNOSIS — R74.8 ELEVATED LIVER ENZYMES: Primary | ICD-10-CM

## 2019-07-09 DIAGNOSIS — M05.79 RHEUMATOID ARTHRITIS INVOLVING MULTIPLE SITES WITH POSITIVE RHEUMATOID FACTOR (HCC): Primary | Chronic | ICD-10-CM

## 2019-07-09 LAB
ALT SERPL-CCNC: 44 U/L (ref 10–40)
AST SERPL-CCNC: 27 U/L (ref 15–37)
CREAT SERPL-MCNC: 0.7 MG/DL (ref 0.6–1.1)
GFR AFRICAN AMERICAN: >60
GFR NON-AFRICAN AMERICAN: >60

## 2019-07-09 NOTE — TELEPHONE ENCOUNTER
I called [facility] to inquire about this PA for Humira 40mg/0.4 ml 2 per 28 days. Spoke to Sp Lowery. She stated that the PA that was already approved was valid for the Citrate free as well. However the patient cannot get this filled again until 7/19/19, which is next Friday. I asked if there was a way they could do an override so the patient could get the Citrate Free today or this week so she wouldn't have to take the regular Humira and experience the injection site pain again? She spoke with a supervisor and they were able to modify the current PA and the patient can get the Citrate Free filled today by [facility]. Please send the script for the Citrate Free to [facility] to fill so the patient can get the medication right away. Thanks.

## 2019-07-10 DIAGNOSIS — M05.79 RHEUMATOID ARTHRITIS INVOLVING MULTIPLE SITES WITH POSITIVE RHEUMATOID FACTOR (HCC): Chronic | ICD-10-CM

## 2019-07-15 DIAGNOSIS — R74.8 ELEVATED LIVER ENZYMES: ICD-10-CM

## 2019-07-15 LAB
ALBUMIN SERPL-MCNC: 4.5 G/DL (ref 3.4–5)
ALP BLD-CCNC: 61 U/L (ref 40–129)
ALT SERPL-CCNC: 52 U/L (ref 10–40)
AST SERPL-CCNC: 32 U/L (ref 15–37)
BILIRUB SERPL-MCNC: 0.3 MG/DL (ref 0–1)
BILIRUBIN DIRECT: <0.2 MG/DL (ref 0–0.3)
BILIRUBIN, INDIRECT: ABNORMAL MG/DL (ref 0–1)
TOTAL PROTEIN: 7.2 G/DL (ref 6.4–8.2)

## 2019-07-16 ENCOUNTER — TELEPHONE (OUTPATIENT)
Dept: RHEUMATOLOGY | Age: 34
End: 2019-07-16

## 2019-07-16 DIAGNOSIS — R74.8 ELEVATED LIVER ENZYMES: Primary | ICD-10-CM

## 2019-07-16 NOTE — TELEPHONE ENCOUNTER
Notes recorded by Barbara Huizar on 7/16/2019 at 3:07 PM EDT  Informed Pt of Dr Iván Murguia notes and instructions    ------    Notes recorded by Velvet Saldana MD on 7/16/2019 at 2:32 PM EDT  Please call the patient and let her know that she continues to have persistently elevated liver enzymes.  I will decrease her methotrexate to 6 tablets once a week and repeat liver enzymes in 2 weeks

## 2019-07-30 DIAGNOSIS — R74.8 ELEVATED LIVER ENZYMES: ICD-10-CM

## 2019-07-30 LAB
ALBUMIN SERPL-MCNC: 4.3 G/DL (ref 3.4–5)
ALP BLD-CCNC: 56 U/L (ref 40–129)
ALT SERPL-CCNC: 39 U/L (ref 10–40)
AST SERPL-CCNC: 24 U/L (ref 15–37)
BILIRUB SERPL-MCNC: <0.2 MG/DL (ref 0–1)
BILIRUBIN DIRECT: <0.2 MG/DL (ref 0–0.3)
BILIRUBIN, INDIRECT: NORMAL MG/DL (ref 0–1)
TOTAL PROTEIN: 6.7 G/DL (ref 6.4–8.2)

## 2019-09-30 DIAGNOSIS — M05.79 RHEUMATOID ARTHRITIS INVOLVING MULTIPLE SITES WITH POSITIVE RHEUMATOID FACTOR (HCC): ICD-10-CM

## 2019-09-30 RX ORDER — FOLIC ACID 1 MG/1
TABLET ORAL
Qty: 90 TABLET | Refills: 4 | Status: SHIPPED | OUTPATIENT
Start: 2019-09-30 | End: 2020-03-09

## 2019-10-07 ENCOUNTER — TELEPHONE (OUTPATIENT)
Dept: INTERNAL MEDICINE CLINIC | Age: 34
End: 2019-10-07

## 2019-10-15 ENCOUNTER — OFFICE VISIT (OUTPATIENT)
Dept: RHEUMATOLOGY | Age: 34
End: 2019-10-15
Payer: COMMERCIAL

## 2019-10-15 VITALS
BODY MASS INDEX: 36.07 KG/M2 | WEIGHT: 196 LBS | DIASTOLIC BLOOD PRESSURE: 70 MMHG | HEIGHT: 62 IN | SYSTOLIC BLOOD PRESSURE: 122 MMHG

## 2019-10-15 DIAGNOSIS — M05.79 RHEUMATOID ARTHRITIS INVOLVING MULTIPLE SITES WITH POSITIVE RHEUMATOID FACTOR (HCC): Primary | ICD-10-CM

## 2019-10-15 DIAGNOSIS — Z51.11 MAINTENANCE CHEMOTHERAPY: ICD-10-CM

## 2019-10-15 LAB
ALT SERPL-CCNC: 14 U/L (ref 10–40)
AST SERPL-CCNC: 12 U/L (ref 15–37)
BASOPHILS ABSOLUTE: 0.1 K/UL (ref 0–0.2)
BASOPHILS RELATIVE PERCENT: 0.4 %
CREAT SERPL-MCNC: 0.6 MG/DL (ref 0.6–1.1)
EOSINOPHILS ABSOLUTE: 0.1 K/UL (ref 0–0.6)
EOSINOPHILS RELATIVE PERCENT: 1.1 %
GFR AFRICAN AMERICAN: >60
GFR NON-AFRICAN AMERICAN: >60
HCT VFR BLD CALC: 40.4 % (ref 36–48)
HEMOGLOBIN: 13.5 G/DL (ref 12–16)
LYMPHOCYTES ABSOLUTE: 3.6 K/UL (ref 1–5.1)
LYMPHOCYTES RELATIVE PERCENT: 29.9 %
MCH RBC QN AUTO: 29.8 PG (ref 26–34)
MCHC RBC AUTO-ENTMCNC: 33.3 G/DL (ref 31–36)
MCV RBC AUTO: 89.7 FL (ref 80–100)
MONOCYTES ABSOLUTE: 0.6 K/UL (ref 0–1.3)
MONOCYTES RELATIVE PERCENT: 4.8 %
NEUTROPHILS ABSOLUTE: 7.8 K/UL (ref 1.7–7.7)
NEUTROPHILS RELATIVE PERCENT: 63.8 %
PDW BLD-RTO: 13.3 % (ref 12.4–15.4)
PLATELET # BLD: 335 K/UL (ref 135–450)
PMV BLD AUTO: 8.8 FL (ref 5–10.5)
RBC # BLD: 4.51 M/UL (ref 4–5.2)
WBC # BLD: 12.2 K/UL (ref 4–11)

## 2019-10-15 PROCEDURE — 99213 OFFICE O/P EST LOW 20 MIN: CPT | Performed by: INTERNAL MEDICINE

## 2019-10-28 ENCOUNTER — TELEPHONE (OUTPATIENT)
Dept: INTERNAL MEDICINE CLINIC | Age: 34
End: 2019-10-28

## 2019-11-25 ENCOUNTER — TELEPHONE (OUTPATIENT)
Dept: RHEUMATOLOGY | Age: 34
End: 2019-11-25

## 2019-12-18 ENCOUNTER — TELEPHONE (OUTPATIENT)
Dept: RHEUMATOLOGY | Age: 34
End: 2019-12-18

## 2019-12-18 DIAGNOSIS — M05.79 RHEUMATOID ARTHRITIS INVOLVING MULTIPLE SITES WITH POSITIVE RHEUMATOID FACTOR (HCC): Primary | ICD-10-CM

## 2019-12-18 RX ORDER — PREDNISONE 10 MG/1
TABLET ORAL
Qty: 21 TABLET | Refills: 1 | Status: SHIPPED | OUTPATIENT
Start: 2019-12-18 | End: 2020-01-01

## 2019-12-19 ENCOUNTER — OFFICE VISIT (OUTPATIENT)
Dept: RHEUMATOLOGY | Age: 34
End: 2019-12-19
Payer: COMMERCIAL

## 2019-12-19 VITALS
WEIGHT: 191.8 LBS | HEART RATE: 74 BPM | SYSTOLIC BLOOD PRESSURE: 124 MMHG | DIASTOLIC BLOOD PRESSURE: 80 MMHG | BODY MASS INDEX: 35.08 KG/M2

## 2019-12-19 DIAGNOSIS — Z51.11 MAINTENANCE CHEMOTHERAPY: ICD-10-CM

## 2019-12-19 DIAGNOSIS — M05.79 RHEUMATOID ARTHRITIS INVOLVING MULTIPLE SITES WITH POSITIVE RHEUMATOID FACTOR (HCC): Primary | ICD-10-CM

## 2019-12-19 LAB
ALT SERPL-CCNC: 21 U/L (ref 10–40)
AST SERPL-CCNC: 13 U/L (ref 15–37)
BASOPHILS ABSOLUTE: 0 K/UL (ref 0–0.2)
BASOPHILS RELATIVE PERCENT: 0.2 %
CREAT SERPL-MCNC: 0.7 MG/DL (ref 0.6–1.1)
EOSINOPHILS ABSOLUTE: 0 K/UL (ref 0–0.6)
EOSINOPHILS RELATIVE PERCENT: 0 %
GFR AFRICAN AMERICAN: >60
GFR NON-AFRICAN AMERICAN: >60
HCT VFR BLD CALC: 39.7 % (ref 36–48)
HEMOGLOBIN: 13 G/DL (ref 12–16)
LYMPHOCYTES ABSOLUTE: 2.2 K/UL (ref 1–5.1)
LYMPHOCYTES RELATIVE PERCENT: 12.1 %
MCH RBC QN AUTO: 28.5 PG (ref 26–34)
MCHC RBC AUTO-ENTMCNC: 32.7 G/DL (ref 31–36)
MCV RBC AUTO: 87.2 FL (ref 80–100)
MONOCYTES ABSOLUTE: 0.7 K/UL (ref 0–1.3)
MONOCYTES RELATIVE PERCENT: 4 %
NEUTROPHILS ABSOLUTE: 15.3 K/UL (ref 1.7–7.7)
NEUTROPHILS RELATIVE PERCENT: 83.7 %
PDW BLD-RTO: 13.6 % (ref 12.4–15.4)
PLATELET # BLD: 328 K/UL (ref 135–450)
PMV BLD AUTO: 8.6 FL (ref 5–10.5)
RBC # BLD: 4.55 M/UL (ref 4–5.2)
WBC # BLD: 18.3 K/UL (ref 4–11)

## 2019-12-19 PROCEDURE — 99213 OFFICE O/P EST LOW 20 MIN: CPT | Performed by: INTERNAL MEDICINE

## 2019-12-20 DIAGNOSIS — D72.829 LEUKOCYTOSIS, UNSPECIFIED TYPE: Primary | ICD-10-CM

## 2020-01-02 ENCOUNTER — TELEPHONE (OUTPATIENT)
Dept: RHEUMATOLOGY | Age: 35
End: 2020-01-02

## 2020-03-09 ENCOUNTER — OFFICE VISIT (OUTPATIENT)
Dept: RHEUMATOLOGY | Age: 35
End: 2020-03-09
Payer: COMMERCIAL

## 2020-03-09 VITALS
SYSTOLIC BLOOD PRESSURE: 116 MMHG | BODY MASS INDEX: 35.12 KG/M2 | DIASTOLIC BLOOD PRESSURE: 73 MMHG | WEIGHT: 192 LBS | HEART RATE: 68 BPM

## 2020-03-09 LAB
ALT SERPL-CCNC: 17 U/L (ref 10–40)
AST SERPL-CCNC: 15 U/L (ref 15–37)
BASOPHILS ABSOLUTE: 0 K/UL (ref 0–0.2)
BASOPHILS RELATIVE PERCENT: 0.3 %
CREAT SERPL-MCNC: 0.6 MG/DL (ref 0.6–1.1)
EOSINOPHILS ABSOLUTE: 0.1 K/UL (ref 0–0.6)
EOSINOPHILS RELATIVE PERCENT: 1.3 %
GFR AFRICAN AMERICAN: >60
GFR NON-AFRICAN AMERICAN: >60
HCT VFR BLD CALC: 40.5 % (ref 36–48)
HEMOGLOBIN: 13.6 G/DL (ref 12–16)
LYMPHOCYTES ABSOLUTE: 3 K/UL (ref 1–5.1)
LYMPHOCYTES RELATIVE PERCENT: 31.7 %
MCH RBC QN AUTO: 29.9 PG (ref 26–34)
MCHC RBC AUTO-ENTMCNC: 33.6 G/DL (ref 31–36)
MCV RBC AUTO: 88.9 FL (ref 80–100)
MONOCYTES ABSOLUTE: 0.5 K/UL (ref 0–1.3)
MONOCYTES RELATIVE PERCENT: 5.7 %
NEUTROPHILS ABSOLUTE: 5.8 K/UL (ref 1.7–7.7)
NEUTROPHILS RELATIVE PERCENT: 61 %
PDW BLD-RTO: 13.9 % (ref 12.4–15.4)
PLATELET # BLD: 329 K/UL (ref 135–450)
PMV BLD AUTO: 8.6 FL (ref 5–10.5)
RBC # BLD: 4.55 M/UL (ref 4–5.2)
WBC # BLD: 9.5 K/UL (ref 4–11)

## 2020-03-09 PROCEDURE — 99214 OFFICE O/P EST MOD 30 MIN: CPT | Performed by: INTERNAL MEDICINE

## 2020-03-09 NOTE — PROGRESS NOTES
 IBS (irritable bowel syndrome)     Rheumatoid arthritis (HCC)      Past Surgical History:   Procedure Laterality Date    LAPAROSCOPY      Endometriosis x2     Social History     Socioeconomic History    Marital status:      Spouse name: Not on file    Number of children: Not on file    Years of education: Not on file    Highest education level: Not on file   Occupational History    Occupation: teacher   Social Needs    Financial resource strain: Not on file    Food insecurity     Worry: Not on file     Inability: Not on file    Transportation needs     Medical: Not on file     Non-medical: Not on file   Tobacco Use    Smoking status: Never Smoker    Smokeless tobacco: Never Used   Substance and Sexual Activity    Alcohol use: No     Alcohol/week: 2.0 standard drinks     Types: 2 Cans of beer per week    Drug use: No    Sexual activity: Not on file   Lifestyle    Physical activity     Days per week: Not on file     Minutes per session: Not on file    Stress: Not on file   Relationships    Social connections     Talks on phone: Not on file     Gets together: Not on file     Attends [employer] service: Not on file     Active member of club or organization: Not on file     Attends meetings of clubs or organizations: Not on file     Relationship status: Not on file    Intimate partner violence     Fear of current or ex partner: Not on file     Emotionally abused: Not on file     Physically abused: Not on file     Forced sexual activity: Not on file   Other Topics Concern    Not on file   Social History Narrative    Not on file     Family History   Problem Relation Age of Onset    Cancer Mother     Arthritis Mother     Lupus Mother     High Blood Pressure Father     Stroke Maternal Grandmother     Heart Disease Maternal Grandmother     Arthritis Maternal Grandmother     Heart Disease Maternal Grandfather     Heart Disease Paternal Grandmother          PHYSICAL EXAM   Vitals: proximally and distally in upper and lower extremities. Psychiatric:  Speech and behavior appropriate           LABS AND IMAGING    Lab Results   Component Value Date    WBC 18.3 (H) 12/19/2019    HGB 13.0 12/19/2019    HCT 39.7 12/19/2019    MCV 87.2 12/19/2019     12/19/2019    LYMPHOPCT 12.1 12/19/2019    RBC 4.55 12/19/2019    MCH 28.5 12/19/2019    MCHC 32.7 12/19/2019    RDW 13.6 12/19/2019     Lab Results   Component Value Date    CREATININE 0.7 12/19/2019    BUN 10 07/07/2017     07/07/2017    K 4.1 07/07/2017     07/07/2017    CO2 24 07/07/2017     Lab Results   Component Value Date    ALT 21 12/19/2019    AST 13 (L) 12/19/2019    ALKPHOS 56 07/30/2019    BILITOT <0.2 07/30/2019       No images are attached to the encounter or orders placed in the encounter. ASSESSMENT:    1. Rheumatoid arthritis involving multiple sites with positive rheumatoid factor ([facility] [address]    2. Maintenance chemotherapy    3. Leukocytosis, unspecified type        PLAN:   1. Rheumatoid arthritis involving multiple sites with positive rheumatoid factor (HCC)  RF, CCP positive  -I do not appreciate any synovitis on joint exam  -Continue Humira 40 mg every week   -Off of methotrexate and Plaquenil due to nausea and vomiting, okay to hold  Okay to hold off Celebrex      2. Maintenance chemotherapy  Labs reviewed. Declines flu and pneumonia vaccine  - Creatinine, Serum  - CBC Auto Differential  - AST  - ALT    3. Leukocytosis, unspecified type  Unknown etiology. NO Signs and symptoms of infection. I will repeat WBC count and if persistently elevated will refer to hematology      The patient indicates understanding of these issues and agrees with the plan. Return in about 3 months (around 6/9/2020). The risks and benefits of my recommendations, as well as other treatment options, benefits and side effects were discussed with the patient.  All questions were answered. ######################################################################    I thank you for giving me the opportunity to participate in [facility]. If you have any questions or concerns please feel free to contact me. I look forward to following  Candice Hunt along with you. Electronically signed by: Guzman Appiah MD, 3/9/2020 3:59 PM    Documentation was done using voice recognition dragon software. Every effort was made to ensure accuracy; however, inadvertent unintentional computerized transcription errors may be present.

## 2020-06-09 ENCOUNTER — VIRTUAL VISIT (OUTPATIENT)
Dept: RHEUMATOLOGY | Age: 35
End: 2020-06-09
Payer: COMMERCIAL

## 2020-06-09 PROCEDURE — 99214 OFFICE O/P EST MOD 30 MIN: CPT | Performed by: INTERNAL MEDICINE

## 2020-06-09 RX ORDER — PREDNISONE 1 MG/1
TABLET ORAL
Qty: 30 TABLET | Refills: 0 | Status: SHIPPED | OUTPATIENT
Start: 2020-06-09 | End: 2020-12-30

## 2020-06-09 NOTE — PROGRESS NOTES
2020\  Chepe Steven is a 30 y.o. female being evaluated by a Virtual Visit (video visit) encounter to address concerns as mentioned above. A caregiver was present when appropriate. Due to this being a TeleHealth encounter (During  public health emergency), evaluation of the following organ systems was limited: Vitals/Constitutional/EENT/Resp/CV/GI//MS/Neuro/Skin/Heme-Lymph-Imm. Pursuant to the emergency declaration under the [address] [place] [address], [address] [place] [address] and the [employer] Act, this Virtual Visit was conducted with patient's (and/or legal guardian's) consent, to reduce the patient's risk of exposure to COVID-19 and provide necessary medical care. The patient (and/or legal guardian) has also been advised to contact this office for worsening conditions or problems, and seek emergency medical treatment and/or call 911 if deemed necessary. Patient identification was verified at the start of the visit: Yes    Total time spent for this encounter: Not billed by time    Services were provided through a video synchronous discussion virtually to substitute for in-person clinic visit. Patient and provider were located at their individual homes. --Rhiannon Oscar MD on 2020 at 11:25 AM    An electronic signature was used to authenticate this note. Patient Name: Chepe Steven  : 1985  Medical Record: [id number]    MEDICATIONS  Current Outpatient Medications   Medication Sig Dispense Refill    predniSONE (DELTASONE) 5 MG tablet Take 4 tabs daily for 3 days, 3 tabs daily for 3 days, 2 tabs daily for 3 days, 1 tab daily for 3 days and stop 30 tablet 0    HUMIRA 40 MG/0.4ML PSKT INJECT 40 MG UNDER THE SKIN EVERY 7 DAYS 4 each 5    cyanocobalamin 1000 MCG/ML injection       FLUoxetine (PROZAC) 20 MG capsule       Levonorgest-Eth Estrad -Day (CAMRESE) 0.15-0.03 &0.01 MG TABS Take  by mouth.      

## 2020-12-16 RX ORDER — ADALIMUMAB 40MG/0.4ML
KIT SUBCUTANEOUS
Qty: 4 EACH | Refills: 5 | OUTPATIENT
Start: 2020-12-16

## 2020-12-17 ENCOUNTER — TELEPHONE (OUTPATIENT)
Dept: RHEUMATOLOGY | Age: 35
End: 2020-12-17

## 2020-12-17 NOTE — TELEPHONE ENCOUNTER
HUMIRA 40 MG/0.4ML PSKT [[id number]]     Order Details  Dose, Route, Frequency: As Directed   Dispense Quantity: 4 each Refills: 5          Sig: INJECT 40 MG UNDER THE SKIN EVERY 7 DAYS           Requires new prior authorization    Dx: M05.79

## 2020-12-23 RX ORDER — FOLIC ACID 1 MG/1
1 TABLET ORAL DAILY
Qty: 90 TABLET | Refills: 1 | Status: SHIPPED | OUTPATIENT
Start: 2020-12-23 | End: 2020-12-30

## 2020-12-30 ENCOUNTER — VIRTUAL VISIT (OUTPATIENT)
Dept: RHEUMATOLOGY | Age: 35
End: 2020-12-30
Payer: COMMERCIAL

## 2020-12-30 DIAGNOSIS — M05.79 RHEUMATOID ARTHRITIS INVOLVING MULTIPLE SITES WITH POSITIVE RHEUMATOID FACTOR (HCC): Chronic | ICD-10-CM

## 2020-12-30 DIAGNOSIS — Z51.11 MAINTENANCE CHEMOTHERAPY: ICD-10-CM

## 2020-12-30 LAB
A/G RATIO: 1.3 (ref 1.1–2.2)
ALBUMIN SERPL-MCNC: 4 G/DL (ref 3.4–5)
ALP BLD-CCNC: 49 U/L (ref 40–129)
ALT SERPL-CCNC: 12 U/L (ref 10–40)
ANION GAP SERPL CALCULATED.3IONS-SCNC: 12 MMOL/L (ref 3–16)
AST SERPL-CCNC: 14 U/L (ref 15–37)
BASOPHILS ABSOLUTE: 0 K/UL (ref 0–0.2)
BASOPHILS RELATIVE PERCENT: 0.5 %
BILIRUB SERPL-MCNC: 0.3 MG/DL (ref 0–1)
BUN BLDV-MCNC: 12 MG/DL (ref 7–20)
CALCIUM SERPL-MCNC: 9.3 MG/DL (ref 8.3–10.6)
CHLORIDE BLD-SCNC: 101 MMOL/L (ref 99–110)
CO2: 24 MMOL/L (ref 21–32)
CREAT SERPL-MCNC: 0.7 MG/DL (ref 0.6–1.1)
EOSINOPHILS ABSOLUTE: 0.1 K/UL (ref 0–0.6)
EOSINOPHILS RELATIVE PERCENT: 1.1 %
GFR AFRICAN AMERICAN: >60
GFR NON-AFRICAN AMERICAN: >60
GLOBULIN: 3.2 G/DL
GLUCOSE BLD-MCNC: 97 MG/DL (ref 70–99)
HCT VFR BLD CALC: 40.6 % (ref 36–48)
HEMOGLOBIN: 13.5 G/DL (ref 12–16)
LYMPHOCYTES ABSOLUTE: 2.4 K/UL (ref 1–5.1)
LYMPHOCYTES RELATIVE PERCENT: 32.3 %
MCH RBC QN AUTO: 29.5 PG (ref 26–34)
MCHC RBC AUTO-ENTMCNC: 33.1 G/DL (ref 31–36)
MCV RBC AUTO: 89.1 FL (ref 80–100)
MONOCYTES ABSOLUTE: 0.3 K/UL (ref 0–1.3)
MONOCYTES RELATIVE PERCENT: 4.6 %
NEUTROPHILS ABSOLUTE: 4.6 K/UL (ref 1.7–7.7)
NEUTROPHILS RELATIVE PERCENT: 61.5 %
PDW BLD-RTO: 13.7 % (ref 12.4–15.4)
PLATELET # BLD: 307 K/UL (ref 135–450)
PMV BLD AUTO: 8.4 FL (ref 5–10.5)
POTASSIUM SERPL-SCNC: 4.2 MMOL/L (ref 3.5–5.1)
RBC # BLD: 4.56 M/UL (ref 4–5.2)
SODIUM BLD-SCNC: 137 MMOL/L (ref 136–145)
TOTAL PROTEIN: 7.2 G/DL (ref 6.4–8.2)
WBC # BLD: 7.4 K/UL (ref 4–11)

## 2020-12-30 PROCEDURE — 99214 OFFICE O/P EST MOD 30 MIN: CPT | Performed by: INTERNAL MEDICINE

## 2020-12-30 RX ORDER — PREDNISONE 1 MG/1
TABLET ORAL
Qty: 40 TABLET | Refills: 0 | Status: SHIPPED | OUTPATIENT
Start: 2020-12-30 | End: 2021-03-31

## 2020-12-30 RX ORDER — ADALIMUMAB 40MG/0.4ML
KIT SUBCUTANEOUS
Qty: 4 EACH | Refills: 5 | Status: SHIPPED | OUTPATIENT
Start: 2020-12-30 | End: 2021-06-11

## 2020-12-30 NOTE — PROGRESS NOTES
2020\  Jah Finch is a 37 y.o. female being evaluated by a Virtual Visit (video visit) encounter to address concerns as mentioned above. A caregiver was present when appropriate. Due to this being a TeleHealth encounter (During RRMPD-08 public health emergency), evaluation of the following organ systems was limited: Vitals/Constitutional/EENT/Resp/CV/GI//MS/Neuro/Skin/Heme-Lymph-Imm. Pursuant to the emergency declaration under the [address] [place] [address], [address] [place] [address] and the [employer] Act, this Virtual Visit was conducted with patient's (and/or legal guardian's) consent, to reduce the patient's risk of exposure to COVID-19 and provide necessary medical care. The patient (and/or legal guardian) has also been advised to contact this office for worsening conditions or problems, and seek emergency medical treatment and/or call 911 if deemed necessary. Patient identification was verified at the start of the visit: Yes    Total time spent for this encounter: Not billed by time    Services were provided through a video synchronous discussion virtually to substitute for in-person clinic visit. Patient and provider were located at their individual homes. --Neelima Velasco MD on 2020 at 9:14 AM    An electronic signature was used to authenticate this note.   Patient Name: Jah Finch  : 1985  Medical Record: [id number]    MEDICATIONS  Current Outpatient Medications   Medication Sig Dispense Refill    Adalimumab (HUMIRA) 40 MG/0.4ML PSKT INJECT 40 MG UNDER THE SKIN EVERY 7 DAYS 4 each 5    predniSONE (DELTASONE) 5 MG tablet Take 4 tabs daily for 4 days, 3 tabs daily for 4 days, 2 tabs daily for 4 days, 1 tab daily for 4 days and stop 40 tablet 0    cyanocobalamin 1000 MCG/ML injection       FLUoxetine (PROZAC) 20 MG capsule       Levonorgest-Eth Estrad -Day (CAMRESE) 0.15-0.03 &0.01 MG TABS Take by mouth.  acetaminophen (TYLENOL) 325 MG tablet Take 650 mg by mouth every 6 hours as needed for Pain. No current facility-administered medications for this visit. ALLERGIES  Allergies   Allergen Reactions    Naproxen Nausea And Vomiting    Ampicillin     Metoclopramide Hives    Pcn [Penicillins]     Celebrex [Celecoxib] Nausea And Vomiting    Codeine Nausea And Vomiting         Comments  No specialty comments available. Background history:  Ney Hernández is a 37 y.o. female who is being seen for follow up evaluation of  rheumatoid arthritis. Patient is a transfer from Dr. Pooja Fernández. Dr. Shaye Austin notes were reviewed in detail. She was diagnosed with rheumatoid arthritis 5 years ago. She was on [employer] which worked initially but lost its efficacy. She is also on methotrexate 8 pills per week, Plaquenil 200 mg once a day. She is on Humira 40 mg every week for past 1.5 years. She is complaining of intermittent pain and swelling in her knuckles, hips, neck and back. She has a morning stiffness lasting for 20 minutes to 45 minutes. She feels that her symptoms have recently worsened. Her pain and swelling last for few hours. She denies any recent fevers or infections. She is able to perform her ADLs. Interim History: She presents for follow-up of rheumatoid arthritis. On Humira 40 mg every week. She has been off of Humira for past 3 weeks. She has noticed worsening pain and swelling in her hands associated with stiffness. She is off of methotrexate and Plaquenil due to nausea and vomiting. She is unable to take oral NSAIDs due to nausea and vomiting. She denies any recent fevers or infections. She denies any other side effects with medications. Blood work reviewed shows positive RF, CCP, elevated ESR and CRP. HPI  ROS      REVIEW OF SYSTEMS: Dry eyes+, IBS, ANXIETY AND DEPRESSION   Constitutional: No unanticipated weight loss or fevers. No fatigue and malaise. Integumentary: No rash, photosensitivity, malar rash, livedo reticularis, alopecia and Raynaud's symptoms, sclerodactyly, skin tightening  Eyes: negative for  visual disturbance and persistent redness, discharge from eyes   ENT: - No tinnitus, loss of hearing, vertigo, or recurrent ear infections.  - No history of nasal/oral ulcers. Cardiovascular: No history of pericarditis, chest pain or murmur or palpitations  Respiratory: No shortness of breath, cough or history of interstitial lung disease. No history of pleurisy. No history of tuberculosis or atypical infections. Gastrointestinal: No history of heart burn, dysphagia or esophageal dysmotility. No inflammatory bowel disease. Genitourinary: No history renal disease, miscarriages. Hematologic/Lymphatic: No abnormal bruising or bleeding, blood clots or swollen lymph nodes. Neurological: No history seizure or focal weakness. No history of neuropathies, paresthesias or hyperesthesias, facial droop, diplopia  Psychiatric: No history of bipolar disease  Endocrine: Denies any thyroid / parathyroid disease and osteoporosis  Allergic/Immunologic: No nasal congestion or hives. I have reviewed patients Past medical History, Social History and Family History as mentioned in her chart and this remains unchanged from previous.     Past Medical History:   Diagnosis Date    Anemia     Endometriosis     IBS (irritable bowel syndrome)     Rheumatoid arthritis (HCC)      Past Surgical History:   Procedure Laterality Date    LAPAROSCOPY      Endometriosis x2     Social History     Socioeconomic History    Marital status:      Spouse name: Not on file    Number of children: Not on file    Years of education: Not on file    Highest education level: Not on file   Occupational History    Occupation: teacher   Social Needs    Financial resource strain: Not on file    Food insecurity     Worry: Not on file     Inability: Not on file   SomethingIndie needs Medical: Not on file     Non-medical: Not on file   Tobacco Use    Smoking status: Never Smoker    Smokeless tobacco: Never Used   Substance and Sexual Activity    Alcohol use: No     Alcohol/week: 2.0 standard drinks     Types: 2 Cans of beer per week    Drug use: No    Sexual activity: Not on file   Lifestyle    Physical activity     Days per week: Not on file     Minutes per session: Not on file    Stress: Not on file   Relationships    Social connections     Talks on phone: Not on file     Gets together: Not on file     Attends Synagogue service: Not on file     Active member of club or organization: Not on file     Attends meetings of clubs or organizations: Not on file     Relationship status: Not on file    Intimate partner violence     Fear of current or ex partner: Not on file     Emotionally abused: Not on file     Physically abused: Not on file     Forced sexual activity: Not on file   Other Topics Concern    Not on file   Social History Narrative    Not on file     Family History   Problem Relation Age of Onset    Cancer Mother     Arthritis Mother     Lupus Mother     High Blood Pressure Father     Stroke Maternal Grandmother     Heart Disease Maternal Grandmother     Arthritis Maternal Grandmother     Heart Disease Maternal Grandfather     Heart Disease Paternal Grandmother        PHYSICAL EXAMINATION:  [ INSTRUCTIONS:  \"[x]\" Indicates a positive item  \"[]\" Indicates a negative item  -- DELETE ALL ITEMS NOT EXAMINED]  Vital Signs: (As obtained by patient/caregiver or practitioner observation)    Blood pressure-  Heart rate-    Respiratory rate-    Temperature-  Pulse oximetry-     Constitutional: [x] Appears well-developed and well-nourished [x] No apparent distress      [] Abnormal-   Mental status  [x] Alert and awake  [x] Oriented to person/place/time [x]Able to follow commands      Eyes:  EOM    [x]  Normal  [] Abnormal-  Sclera  [x]  Normal  [] Abnormal -         Discharge [x] None visible  [] Abnormal -    HENT:   [x] Normocephalic, atraumatic. [] Abnormal   [x] Mouth/Throat: Mucous membranes are moist.     External Ears [x] Normal  [] Abnormal-     Neck: [x] No visualized mass     Pulmonary/Chest: [x] Respiratory effort normal.  [x] No visualized signs of difficulty breathing or respiratory distress        [] Abnormal-      Musculoskeletal:   [x] Normal gait with no signs of ataxia         [x] Normal range of motion of neck        [x] Abnormal-puffiness in bilateral MCP joints      Neurological:        [x] No Facial Asymmetry (Cranial nerve 7 motor function) (limited exam to video visit)          [x] No gaze palsy        [] Abnormal-         Skin:        [x] No significant exanthematous lesions or discoloration noted on facial skin         [] Abnormal-            Psychiatric:       [x] Normal Affect [x] No Hallucinations        [] Abnormal-           LABS AND IMAGING    Lab Results   Component Value Date    WBC 9.5 03/09/2020    HGB 13.6 03/09/2020    HCT 40.5 03/09/2020    MCV 88.9 03/09/2020     03/09/2020    LYMPHOPCT 31.7 03/09/2020    RBC 4.55 03/09/2020    MCH 29.9 03/09/2020    MCHC 33.6 03/09/2020    RDW 13.9 03/09/2020     Lab Results   Component Value Date    CREATININE 0.6 03/09/2020    BUN 10 07/07/2017     07/07/2017    K 4.1 07/07/2017     07/07/2017    CO2 24 07/07/2017     Lab Results   Component Value Date    ALT 17 03/09/2020    AST 15 03/09/2020    ALKPHOS 56 07/30/2019    BILITOT <0.2 07/30/2019       No images are attached to the encounter or orders placed in the encounter. ASSESSMENT:    1. Rheumatoid arthritis involving multiple sites with positive rheumatoid factor ([facility] [address]    2. Maintenance chemotherapy        PLAN:   1.  Rheumatoid arthritis involving multiple sites with positive rheumatoid factor (HCC)  RF, CCP positive  -Experiencing a flareup off of Humira  Will resume Humira 40 mg every week  Prednisone taper  -Off of methotrexate and Plaquenil due to nausea and vomiting, okay to hold      2. Maintenance chemotherapy  Labs reviewed. Declines flu and pneumonia vaccine  - Creatinine, Serum  - CBC Auto Differential  - AST  - ALT  Daily  The patient indicates understanding of these issues and agrees with the plan. Return in about 3 months (around 3/30/2021). The risks and benefits of my recommendations, as well as other treatment options, benefits and side effects were discussed with the patient. All questions were answered. ######################################################################    I thank you for giving me the opportunity to participate in [facility]. If you have any questions or concerns please feel free to contact me. I look forward to following  Ted Saavedra along with you. Electronically signed by: Kinjal James MD, 12/30/2020 9:14 AM    Documentation was done using voice recognition dragon software. Every effort was made to ensure accuracy; however, inadvertent unintentional computerized transcription errors may be present.

## 2021-01-03 LAB
QUANTI TB GOLD PLUS: NEGATIVE
QUANTI TB1 MINUS NIL: 0 IU/ML (ref 0–0.34)
QUANTI TB2 MINUS NIL: 0 IU/ML (ref 0–0.34)
QUANTIFERON MITOGEN: 9.34 IU/ML
QUANTIFERON NIL: 0.02 IU/ML

## 2021-01-07 NOTE — TELEPHONE ENCOUNTER
I just received a questionaire from the ins. Plan.  I answered the questions and faxed back with clinicals

## 2021-03-31 ENCOUNTER — VIRTUAL VISIT (OUTPATIENT)
Dept: RHEUMATOLOGY | Age: 36
End: 2021-03-31
Payer: COMMERCIAL

## 2021-03-31 DIAGNOSIS — M05.79 RHEUMATOID ARTHRITIS INVOLVING MULTIPLE SITES WITH POSITIVE RHEUMATOID FACTOR (HCC): Primary | ICD-10-CM

## 2021-03-31 DIAGNOSIS — Z51.11 MAINTENANCE CHEMOTHERAPY: ICD-10-CM

## 2021-03-31 PROCEDURE — 99213 OFFICE O/P EST LOW 20 MIN: CPT | Performed by: INTERNAL MEDICINE

## 2021-03-31 NOTE — PROGRESS NOTES
3/31/2021\  Isaac Perry is a 37 y.o. female being evaluated by a Virtual Visit (video visit) encounter to address concerns as mentioned above. A caregiver was present when appropriate. Due to this being a TeleHealth encounter (During L-08 public health emergency), evaluation of the following organ systems was limited: Vitals/Constitutional/EENT/Resp/CV/GI//MS/Neuro/Skin/Heme-Lymph-Imm. Pursuant to the emergency declaration under the [address] [place] [address], [address] [place] [address] authority and the [employer] Act, this Virtual Visit was conducted with patient's (and/or legal guardian's) consent, to reduce the patient's risk of exposure to COVID-19 and provide necessary medical care. The patient (and/or legal guardian) has also been advised to contact this office for worsening conditions or problems, and seek emergency medical treatment and/or call 911 if deemed necessary. Patient identification was verified at the start of the visit: Yes    Total time spent for this encounter: Not billed by time    Services were provided through a video synchronous discussion virtually to substitute for in-person clinic visit. Patient and provider were located at their individual homes. --Keon Drew MD on 3/31/2021 at 3:25 PM    An electronic signature was used to authenticate this note. Patient Name: Isaac Perry  : 1985  Medical Record: [id number]    MEDICATIONS  Current Outpatient Medications   Medication Sig Dispense Refill    Adalimumab (HUMIRA) 40 MG/0.4ML PSKT INJECT 40 MG UNDER THE SKIN EVERY 7 DAYS 4 each 5    FLUoxetine (PROZAC) 20 MG capsule       Levonorgest-Eth Estrad -Day (CAMRESE) 0.15-0.03 &0.01 MG TABS Take  by mouth.  acetaminophen (TYLENOL) 325 MG tablet Take 650 mg by mouth every 6 hours as needed for Pain. No current facility-administered medications for this visit.         ALLERGIES  Allergies Allergen Reactions    Naproxen Nausea And Vomiting    Ampicillin     Metoclopramide Hives    Pcn [Penicillins]     Celebrex [Celecoxib] Nausea And Vomiting    Codeine Nausea And Vomiting         Comments  No specialty comments available. Background history:  Suresh Jaramillo is a 37 y.o. female who is being seen for follow up evaluation of  rheumatoid arthritis. Patient is a transfer from Dr. Susy Chase. Dr. Juanita Nunes notes were reviewed in detail. She was diagnosed with rheumatoid arthritis 5 years ago. She was on [employer] which worked initially but lost its efficacy. She is also on methotrexate 8 pills per week, Plaquenil 200 mg once a day. She is on Humira 40 mg every week for past 1.5 years. She is complaining of intermittent pain and swelling in her knuckles, hips, neck and back. She has a morning stiffness lasting for 20 minutes to 45 minutes. She feels that her symptoms have recently worsened. Her pain and swelling last for few hours. She denies any recent fevers or infections. She is able to perform her ADLs. Interim History: She presents for follow-up of rheumatoid arthritis. On Humira 40 mg every week. She reports occasional achiness in the hands especially with overactivity. She uses compression gloves which helps. She denies any swelling in the joints. She has morning stiffness lasting for 15 minutes 45 minutes. She is off of methotrexate and Plaquenil due to nausea and vomiting. She is unable to take oral NSAIDs due to nausea and vomiting. She denies any recent fevers or infections. She denies any other side effects with medications. Blood work reviewed shows positive RF, CCP, elevated ESR and CRP. HPI  ROS      REVIEW OF SYSTEMS: Dry eyes+, IBS, ANXIETY AND DEPRESSION   Constitutional: No unanticipated weight loss or fevers. No fatigue and malaise.   Integumentary: No rash, photosensitivity, malar rash, livedo reticularis, alopecia and Raynaud's symptoms, sclerodactyly, skin tightening  Eyes: negative for  visual disturbance and persistent redness, discharge from eyes   ENT: - No tinnitus, loss of hearing, vertigo, or recurrent ear infections.  - No history of nasal/oral ulcers. Cardiovascular: No history of pericarditis, chest pain or murmur or palpitations  Respiratory: No shortness of breath, cough or history of interstitial lung disease. No history of pleurisy. No history of tuberculosis or atypical infections. Gastrointestinal: No history of heart burn, dysphagia or esophageal dysmotility. No inflammatory bowel disease. Genitourinary: No history renal disease, miscarriages. Hematologic/Lymphatic: No abnormal bruising or bleeding, blood clots or swollen lymph nodes. Neurological: No history seizure or focal weakness. No history of neuropathies, paresthesias or hyperesthesias, facial droop, diplopia  Psychiatric: No history of bipolar disease  Endocrine: Denies any thyroid / parathyroid disease and osteoporosis  Allergic/Immunologic: No nasal congestion or hives. I have reviewed patients Past medical History, Social History and Family History as mentioned in her chart and this remains unchanged from previous.     Past Medical History:   Diagnosis Date    Anemia     Endometriosis     IBS (irritable bowel syndrome)     Rheumatoid arthritis (HCC)      Past Surgical History:   Procedure Laterality Date    LAPAROSCOPY      Endometriosis x2     Social History     Socioeconomic History    Marital status:      Spouse name: Not on file    Number of children: Not on file    Years of education: Not on file    Highest education level: Not on file   Occupational History    Occupation: teacher   Social Needs    Financial resource strain: Not on file    Food insecurity     Worry: Not on file     Inability: Not on file    Transportation needs     Medical: Not on file     Non-medical: Not on file   Tobacco Use    Smoking status: Never Smoker    Mouth/Throat: Mucous membranes are moist.     External Ears [x] Normal  [] Abnormal-     Neck: [x] No visualized mass     Pulmonary/Chest: [x] Respiratory effort normal.  [x] No visualized signs of difficulty breathing or respiratory distress        [] Abnormal-      Musculoskeletal:   [x] Normal gait with no signs of ataxia         [x] Normal range of motion of neck        [x] Abnormal-puffiness in bilateral MCP joints      Neurological:        [x] No Facial Asymmetry (Cranial nerve 7 motor function) (limited exam to video visit)          [x] No gaze palsy        [] Abnormal-         Skin:        [x] No significant exanthematous lesions or discoloration noted on facial skin         [] Abnormal-            Psychiatric:       [x] Normal Affect [x] No Hallucinations        [] Abnormal-           LABS AND IMAGING    Lab Results   Component Value Date    WBC 7.4 12/30/2020    HGB 13.5 12/30/2020    HCT 40.6 12/30/2020    MCV 89.1 12/30/2020     12/30/2020    LYMPHOPCT 32.3 12/30/2020    RBC 4.56 12/30/2020    MCH 29.5 12/30/2020    MCHC 33.1 12/30/2020    RDW 13.7 12/30/2020     Lab Results   Component Value Date    CREATININE 0.7 12/30/2020    BUN 12 12/30/2020     12/30/2020    K 4.2 12/30/2020     12/30/2020    CO2 24 12/30/2020     Lab Results   Component Value Date    ALT 12 12/30/2020    AST 14 (L) 12/30/2020    ALKPHOS 49 12/30/2020    BILITOT 0.3 12/30/2020       No images are attached to the encounter or orders placed in the encounter. ASSESSMENT:    1. Rheumatoid arthritis involving multiple sites with positive rheumatoid factor ([facility] [address]    2. Maintenance chemotherapy        PLAN:   1. Rheumatoid arthritis involving multiple sites with positive rheumatoid factor (HCC)  RF, CCP positive  Stable. No recent joint flareups  Will r continue Humira 40 mg every week    -Off of methotrexate and Plaquenil due to nausea and vomiting, okay to hold      2. Maintenance chemotherapy  Labs reviewed. Declines flu and pneumonia vaccine    The patient indicates understanding of these issues and agrees with the plan. Return in about 6 months (around 9/30/2021). The risks and benefits of my recommendations, as well as other treatment options, benefits and side effects were discussed with the patient. All questions were answered. ######################################################################    I thank you for giving me the opportunity to participate in [facility]. If you have any questions or concerns please feel free to contact me. I look forward to following  Shyann Luna along with you. Electronically signed by: Magda Hunt MD, 3/31/2021 3:25 PM    Documentation was done using voice recognition dragon software. Every effort was made to ensure accuracy; however, inadvertent unintentional computerized transcription errors may be present.

## 2021-05-17 DIAGNOSIS — M05.79 RHEUMATOID ARTHRITIS INVOLVING MULTIPLE SITES WITH POSITIVE RHEUMATOID FACTOR (HCC): ICD-10-CM

## 2021-05-17 RX ORDER — FOLIC ACID 1 MG/1
TABLET ORAL
Qty: 90 TABLET | Refills: 3 | Status: SHIPPED | OUTPATIENT
Start: 2021-05-17 | End: 2021-07-30

## 2021-06-11 DIAGNOSIS — M05.79 RHEUMATOID ARTHRITIS INVOLVING MULTIPLE SITES WITH POSITIVE RHEUMATOID FACTOR (HCC): Chronic | ICD-10-CM

## 2021-06-11 RX ORDER — ADALIMUMAB 40MG/0.4ML
KIT SUBCUTANEOUS
Qty: 2 EACH | Refills: 2 | Status: SHIPPED | OUTPATIENT
Start: 2021-06-11 | End: 2021-09-21 | Stop reason: SDUPTHER

## 2021-07-30 ENCOUNTER — OFFICE VISIT (OUTPATIENT)
Dept: RHEUMATOLOGY | Age: 36
End: 2021-07-30
Payer: COMMERCIAL

## 2021-07-30 VITALS
BODY MASS INDEX: 35.67 KG/M2 | HEART RATE: 64 BPM | WEIGHT: 195 LBS | SYSTOLIC BLOOD PRESSURE: 102 MMHG | DIASTOLIC BLOOD PRESSURE: 58 MMHG

## 2021-07-30 DIAGNOSIS — Z51.11 MAINTENANCE CHEMOTHERAPY: ICD-10-CM

## 2021-07-30 DIAGNOSIS — M05.79 RHEUMATOID ARTHRITIS INVOLVING MULTIPLE SITES WITH POSITIVE RHEUMATOID FACTOR (HCC): Primary | ICD-10-CM

## 2021-07-30 LAB
ALT SERPL-CCNC: 19 U/L (ref 10–40)
AST SERPL-CCNC: 17 U/L (ref 15–37)
BASOPHILS ABSOLUTE: 0 K/UL (ref 0–0.2)
BASOPHILS RELATIVE PERCENT: 0.2 %
CREAT SERPL-MCNC: 0.7 MG/DL (ref 0.6–1.1)
EOSINOPHILS ABSOLUTE: 0.1 K/UL (ref 0–0.6)
EOSINOPHILS RELATIVE PERCENT: 1 %
GFR AFRICAN AMERICAN: >60
GFR NON-AFRICAN AMERICAN: >60
HCT VFR BLD CALC: 39.7 % (ref 36–48)
HEMOGLOBIN: 13.5 G/DL (ref 12–16)
LYMPHOCYTES ABSOLUTE: 2.8 K/UL (ref 1–5.1)
LYMPHOCYTES RELATIVE PERCENT: 30.6 %
MCH RBC QN AUTO: 30.2 PG (ref 26–34)
MCHC RBC AUTO-ENTMCNC: 34 G/DL (ref 31–36)
MCV RBC AUTO: 88.8 FL (ref 80–100)
MONOCYTES ABSOLUTE: 0.4 K/UL (ref 0–1.3)
MONOCYTES RELATIVE PERCENT: 3.9 %
NEUTROPHILS ABSOLUTE: 5.9 K/UL (ref 1.7–7.7)
NEUTROPHILS RELATIVE PERCENT: 64.3 %
PDW BLD-RTO: 13.4 % (ref 12.4–15.4)
PLATELET # BLD: 287 K/UL (ref 135–450)
PMV BLD AUTO: 8.6 FL (ref 5–10.5)
RBC # BLD: 4.47 M/UL (ref 4–5.2)
WBC # BLD: 9.2 K/UL (ref 4–11)

## 2021-07-30 PROCEDURE — 99214 OFFICE O/P EST MOD 30 MIN: CPT | Performed by: INTERNAL MEDICINE

## 2021-07-30 RX ORDER — FLUOXETINE HYDROCHLORIDE 40 MG/1
40 CAPSULE ORAL DAILY
COMMUNITY

## 2021-07-30 RX ORDER — DICLOFENAC SODIUM 75 MG/1
75 TABLET, DELAYED RELEASE ORAL 2 TIMES DAILY
Qty: 60 TABLET | Refills: 2 | Status: SHIPPED | OUTPATIENT
Start: 2021-07-30 | End: 2021-11-01 | Stop reason: SDUPTHER

## 2021-07-30 RX ORDER — ERGOCALCIFEROL (VITAMIN D2) 1250 MCG
50000 CAPSULE ORAL WEEKLY
COMMUNITY
Start: 2021-05-16 | End: 2021-07-30

## 2021-07-30 NOTE — PROGRESS NOTES
2021\  Patient Name: Liz Cerda  : 1985  Medical Record: [id number]    MEDICATIONS  Current Outpatient Medications   Medication Sig Dispense Refill    FLUoxetine (PROZAC) 40 MG capsule Take 40 mg by mouth daily      diclofenac (VOLTAREN) 75 MG EC tablet Take 1 tablet by mouth 2 times daily 60 tablet 2    HUMIRA 40 MG/0.4ML PSKT INJECT 40 MG UNDER THE SKIN EVERY 7 DAYS. 2 each 2    Levonorgest-Eth Estrad 91-Day (CAMRESE) 0.15-0.03 &0.01 MG TABS Take  by mouth.  acetaminophen (TYLENOL) 325 MG tablet Take 650 mg by mouth every 6 hours as needed for Pain. No current facility-administered medications for this visit. ALLERGIES  Allergies   Allergen Reactions    Naproxen Nausea And Vomiting    Ampicillin     Metoclopramide Hives    Pcn [Penicillins]     Celebrex [Celecoxib] Nausea And Vomiting    Codeine Nausea And Vomiting         Comments  No specialty comments available. Background history:  Liz Cerda is a 31 y.o. female who is being seen for follow up evaluation of  rheumatoid arthritis. Patient is a transfer from Dr. Dannie Gutierrez. Dr. Dian Arias notes were reviewed in detail. She was diagnosed with rheumatoid arthritis 5 years ago. She was on [employer] which worked initially but lost its efficacy. She is also on methotrexate 8 pills per week, Plaquenil 200 mg once a day. She is on Humira 40 mg every week for past 1.5 years. She is complaining of intermittent pain and swelling in her knuckles, hips, neck and back. She has a morning stiffness lasting for 20 minutes to 45 minutes. She feels that her symptoms have recently worsened. Her pain and swelling last for few hours. She denies any recent fevers or infections. She is able to perform her ADLs. Interim History: She presents for follow-up of rheumatoid arthritis. On Humira 40 mg every week.   She is getting on and off achiness in the joints especially in her hands, shoulders, knees associated with occasional swelling and morning stiffness lasting for 1 to 1-1/2-hour. She is off of methotrexate and Plaquenil due to nausea and vomiting. She denies any recent fevers or infections. She denies any other side effects with medications. Blood work reviewed shows positive RF, CCP, elevated ESR and CRP. HPI  ROS      REVIEW OF SYSTEMS: Dry eyes+, IBS, ANXIETY AND DEPRESSION   Constitutional: No unanticipated weight loss or fevers. No fatigue and malaise. Integumentary: No rash, photosensitivity, malar rash, livedo reticularis, alopecia and Raynaud's symptoms, sclerodactyly, skin tightening  Eyes: negative for  visual disturbance and persistent redness, discharge from eyes   ENT: - No tinnitus, loss of hearing, vertigo, or recurrent ear infections.  - No history of nasal/oral ulcers. Cardiovascular: No history of pericarditis, chest pain or murmur or palpitations  Respiratory: No shortness of breath, cough or history of interstitial lung disease. No history of pleurisy. No history of tuberculosis or atypical infections. Gastrointestinal: No history of heart burn, dysphagia or esophageal dysmotility. No inflammatory bowel disease. Genitourinary: No history renal disease, miscarriages. Hematologic/Lymphatic: No abnormal bruising or bleeding, blood clots or swollen lymph nodes. Neurological: No history seizure or focal weakness. No history of neuropathies, paresthesias or hyperesthesias, facial droop, diplopia  Psychiatric: No history of bipolar disease  Endocrine: Denies any thyroid / parathyroid disease and osteoporosis  Allergic/Immunologic: No nasal congestion or hives. I have reviewed patients Past medical History, Social History and Family History as mentioned in her chart and this remains unchanged from previous.     Past Medical History:   Diagnosis Date    Anemia     Endometriosis     IBS (irritable bowel syndrome)     Rheumatoid arthritis ([facility] [address]      Past Surgical History:   Procedure Laterality Date    LAPAROSCOPY      Endometriosis x2     Social History     Socioeconomic History    Marital status:      Spouse name: Not on file    Number of children: Not on file    Years of education: Not on file    Highest education level: Not on file   Occupational History    Occupation: teacher   Tobacco Use    Smoking status: Never Smoker    Smokeless tobacco: Never Used   Vaping Use    Vaping Use: Never used   Substance and Sexual Activity    Alcohol use: No     Alcohol/week: 2.0 standard drinks     Types: 2 Cans of beer per week    Drug use: No    Sexual activity: Not on file   Other Topics Concern    Not on file   Social History Narrative    Not on file     Social Determinants of Health     Financial Resource Strain:     Difficulty of Paying Living Expenses:    Food Insecurity:     Worried About Running Out of Food in the Last Year:     [address] in the Last Year:    Transportation Needs:     Lack of Transportation (Medical):      Lack of Transportation (Non-Medical):    Physical Activity:     Days of Exercise per Week:     Minutes of Exercise per Session:    Stress:     Feeling of Stress :    Social Connections:     Frequency of Communication with Friends and Family:     Frequency of Social Gatherings with Friends and Family:     Attends [employer] Services:     Active Member of Clubs or Organizations:     Attends Club or Organization Meetings:     Marital Status:    Intimate Partner Violence:     Fear of Current or Ex-Partner:     Emotionally Abused:     Physically Abused:     Sexually Abused:      Family History   Problem Relation Age of Onset    Cancer Mother     Arthritis Mother     Lupus Mother     High Blood Pressure Father     Stroke Maternal Grandmother     Heart Disease Maternal Grandmother     Arthritis Maternal Grandmother     Heart Disease Maternal Grandfather     Heart Disease Paternal Grandmother        PHYSICAL EXAMINATION:    Vitals:    07/30/21 0722   BP: (!) 102/58   Pulse: 64   Weight: 195 lb (88.5 kg)         Physical Exam  Constitutional:  Well developed, well nourished, no acute distress, non-toxic appearance   Musculoskeletal:    RIGHT  Swell  Tender  ROM  LEFT  Swell  Tender  ROM    DIP2  0  0  FULL   0  0  FULL    DIP3  0  0  FULL   0  0  FULL    DIP4  0  0  FULL   0  0  FULL    DIP5  0  0  FULL   0  0  FULL    PIP1  0  0  FULL   0  0 FULL    PIP2  0  0  FULL   0  0  FULL    PIP3  0  0  FULL   0  0  FULL    PIP4  0  0  FULL   0  0 FULL    PIP5  0  0  FULL   0  0  FULL    MCP1  0  0  FULL   0  0  FULL    MCP2  0  0  FULL   0  0  FULL    MCP3  0  0  FULL   0  0  FULL    MCP4  0  0  FULL   0  0  FULL    MCP5  0  0  FULL   0  0  FULL    Wrist  0  0  FULL   0  0  FULL    Elbow  0  0  FULL   0  0  FULL    Shouldr  0  0  FULL   0  0  FULL    Hip  0  0  FULL   0  0  FULL    Knee  0  0   crepitus   0  0   crepitus    Ankle  0  0  FULL   0  0  FULL    MTP1  0  0  FULL   0  0  FULL    MTP2  0  0  FULL   0  0  FULL    MTP3  0  0  FULL   0  0  FULL    MTP4  0  0  FULL   0  0  FULL    MTP5  0  0  FULL   0  0  FULL    IP1  0  0  FULL   0  0  FULL    IP2  0  0  FULL   0  0  FULL    IP3  0  0  FULL   0  0  FULL    IP4  0  0  FULL   0  0  FULL    IP5  0  0  FULL   0 0 FULL     Ambulates without assistance, normal gait  Back- no tenderness. Eyes:  PERRL, extra ocular movements intact, conjunctiva normal   HEENT:  Atraumatic, normocephalic, external ears normal, oropharynx moist, no pharyngeal exudates. Respiratory:  No respiratory distress  GI:  Soft, nondistended, normal bowel sounds, nontender, no organomegaly, no mass, no rebound, no guarding   :  No costovertebral angle tenderness   Integument:  Well hydrated, no rash or telangiectasias  Lymphatic:  No lymphadenopathy noted   Neurologic:   Alert & oriented x 3, CN 2-12 normal, no focal deficits noted. Sensations Intact. Muscle strength 5/5 proximally and distally in upper and lower extremities. Psychiatric:  Speech and behavior appropriate           LABS AND IMAGING    Lab Results   Component Value Date    WBC 7.4 12/30/2020    HGB 13.5 12/30/2020    HCT 40.6 12/30/2020    MCV 89.1 12/30/2020     12/30/2020    LYMPHOPCT 32.3 12/30/2020    RBC 4.56 12/30/2020    MCH 29.5 12/30/2020    MCHC 33.1 12/30/2020    RDW 13.7 12/30/2020     Lab Results   Component Value Date    CREATININE 0.7 12/30/2020    BUN 12 12/30/2020     12/30/2020    K 4.2 12/30/2020     12/30/2020    CO2 24 12/30/2020     Lab Results   Component Value Date    ALT 12 12/30/2020    AST 14 (L) 12/30/2020    ALKPHOS 49 12/30/2020    BILITOT 0.3 12/30/2020       No images are attached to the encounter or orders placed in the encounter. ASSESSMENT:    1. Rheumatoid arthritis involving multiple sites with positive rheumatoid factor ([facility] [address]    2. Maintenance chemotherapy        PLAN:   1. Rheumatoid arthritis involving multiple sites with positive rheumatoid factor (HCC)  RF, CCP positive  No active synovitis on joint exam  Continues to have pain/achiness in the joints most likely due to osteoarthritis  Will obtain bilateral hand and foot x-rays  Advised to start diclofenac 75 mg twice a day as needed  Will r continue Humira 40 mg every week    -Off of methotrexate and Plaquenil due to nausea and vomiting, okay to hold      2. Maintenance chemotherapy  Labs reviewed. Declines flu and pneumonia vaccine    The patient indicates understanding of these issues and agrees with the plan. Return in about 3 months (around 10/30/2021). The risks and benefits of my recommendations, as well as other treatment options, benefits and side effects were discussed with the patient. All questions were answered. ######################################################################    I thank you for giving me the opportunity to participate in [facility]. If you have any questions or concerns please feel free to contact me. I look forward to following  Brandie Macias along with you. Electronically signed by: Mame Steven MD, MD, 7/30/2021 8:17 AM    Documentation was done using voice recognition dragon software. Every effort was made to ensure accuracy; however, inadvertent unintentional computerized transcription errors may be present.

## 2021-09-21 DIAGNOSIS — M05.79 RHEUMATOID ARTHRITIS INVOLVING MULTIPLE SITES WITH POSITIVE RHEUMATOID FACTOR (HCC): Chronic | ICD-10-CM

## 2021-09-21 RX ORDER — ADALIMUMAB 40MG/0.4ML
KIT SUBCUTANEOUS
Qty: 4 EACH | Refills: 5 | Status: SHIPPED | OUTPATIENT
Start: 2021-09-21 | End: 2021-09-27 | Stop reason: SDUPTHER

## 2021-09-27 ENCOUNTER — TELEPHONE (OUTPATIENT)
Dept: INTERNAL MEDICINE CLINIC | Age: 36
End: 2021-09-27

## 2021-09-27 DIAGNOSIS — M05.79 RHEUMATOID ARTHRITIS INVOLVING MULTIPLE SITES WITH POSITIVE RHEUMATOID FACTOR (HCC): Chronic | ICD-10-CM

## 2021-09-27 NOTE — TELEPHONE ENCOUNTER
Patient is requesting prescription for Adalimumab (HUMIRA) 40 MG/0.4ML PSKT be sent to [address] [place] [address]. It was sent to [facility] 9/21 but patient states she needs it sent to [address] [place] [address]

## 2021-09-28 RX ORDER — ADALIMUMAB 40MG/0.4ML
KIT SUBCUTANEOUS
Qty: 4 EACH | Refills: 5 | Status: SHIPPED | OUTPATIENT
Start: 2021-09-28 | End: 2022-09-06 | Stop reason: SDUPTHER

## 2021-10-29 ENCOUNTER — HOSPITAL ENCOUNTER (OUTPATIENT)
Age: 36
Discharge: HOME | End: 2021-10-29
Payer: COMMERCIAL

## 2021-10-29 ENCOUNTER — HOSPITAL ENCOUNTER (OUTPATIENT)
Dept: GENERAL RADIOLOGY | Age: 36
Discharge: HOME | End: 2021-10-29
Payer: COMMERCIAL

## 2021-10-29 DIAGNOSIS — M05.79 RHEUMATOID ARTHRITIS INVOLVING MULTIPLE SITES WITH POSITIVE RHEUMATOID FACTOR (HCC): ICD-10-CM

## 2021-10-29 PROCEDURE — 73130 X-RAY EXAM OF HAND: CPT

## 2021-10-29 PROCEDURE — 73630 X-RAY EXAM OF FOOT: CPT

## 2021-11-01 ENCOUNTER — OFFICE VISIT (OUTPATIENT)
Dept: RHEUMATOLOGY | Age: 36
End: 2021-11-01
Payer: COMMERCIAL

## 2021-11-01 VITALS
BODY MASS INDEX: 35.63 KG/M2 | HEART RATE: 60 BPM | SYSTOLIC BLOOD PRESSURE: 120 MMHG | WEIGHT: 194.8 LBS | DIASTOLIC BLOOD PRESSURE: 68 MMHG

## 2021-11-01 DIAGNOSIS — M05.79 RHEUMATOID ARTHRITIS INVOLVING MULTIPLE SITES WITH POSITIVE RHEUMATOID FACTOR (HCC): ICD-10-CM

## 2021-11-01 DIAGNOSIS — Z51.11 MAINTENANCE CHEMOTHERAPY: ICD-10-CM

## 2021-11-01 DIAGNOSIS — M05.79 RHEUMATOID ARTHRITIS INVOLVING MULTIPLE SITES WITH POSITIVE RHEUMATOID FACTOR (HCC): Primary | ICD-10-CM

## 2021-11-01 LAB
A/G RATIO: 1.2 (ref 1.1–2.2)
ALBUMIN SERPL-MCNC: 4.2 G/DL (ref 3.4–5)
ALP BLD-CCNC: 61 U/L (ref 40–129)
ALT SERPL-CCNC: 32 U/L (ref 10–40)
ANION GAP SERPL CALCULATED.3IONS-SCNC: 15 MMOL/L (ref 3–16)
AST SERPL-CCNC: 32 U/L (ref 15–37)
BASOPHILS ABSOLUTE: 0 K/UL (ref 0–0.2)
BASOPHILS RELATIVE PERCENT: 0.3 %
BILIRUB SERPL-MCNC: <0.2 MG/DL (ref 0–1)
BUN BLDV-MCNC: 11 MG/DL (ref 7–20)
CALCIUM SERPL-MCNC: 9.4 MG/DL (ref 8.3–10.6)
CHLORIDE BLD-SCNC: 101 MMOL/L (ref 99–110)
CO2: 22 MMOL/L (ref 21–32)
CREAT SERPL-MCNC: 0.6 MG/DL (ref 0.6–1.1)
EOSINOPHILS ABSOLUTE: 0.1 K/UL (ref 0–0.6)
EOSINOPHILS RELATIVE PERCENT: 1.3 %
GFR AFRICAN AMERICAN: >60
GFR NON-AFRICAN AMERICAN: >60
GLUCOSE BLD-MCNC: 85 MG/DL (ref 70–99)
HCT VFR BLD CALC: 41.1 % (ref 36–48)
HEMOGLOBIN: 13.6 G/DL (ref 12–16)
LYMPHOCYTES ABSOLUTE: 3.2 K/UL (ref 1–5.1)
LYMPHOCYTES RELATIVE PERCENT: 37.8 %
MCH RBC QN AUTO: 29.5 PG (ref 26–34)
MCHC RBC AUTO-ENTMCNC: 33.1 G/DL (ref 31–36)
MCV RBC AUTO: 89.4 FL (ref 80–100)
MONOCYTES ABSOLUTE: 0.6 K/UL (ref 0–1.3)
MONOCYTES RELATIVE PERCENT: 6.9 %
NEUTROPHILS ABSOLUTE: 4.5 K/UL (ref 1.7–7.7)
NEUTROPHILS RELATIVE PERCENT: 53.7 %
PDW BLD-RTO: 13.4 % (ref 12.4–15.4)
PLATELET # BLD: 294 K/UL (ref 135–450)
PMV BLD AUTO: 8.3 FL (ref 5–10.5)
POTASSIUM SERPL-SCNC: 4.3 MMOL/L (ref 3.5–5.1)
RBC # BLD: 4.59 M/UL (ref 4–5.2)
SODIUM BLD-SCNC: 138 MMOL/L (ref 136–145)
TOTAL PROTEIN: 7.6 G/DL (ref 6.4–8.2)
WBC # BLD: 8.4 K/UL (ref 4–11)

## 2021-11-01 PROCEDURE — 99213 OFFICE O/P EST LOW 20 MIN: CPT | Performed by: INTERNAL MEDICINE

## 2021-11-01 RX ORDER — FOLIC ACID 1 MG/1
TABLET ORAL
COMMUNITY
Start: 2021-09-19 | End: 2021-11-01

## 2021-11-01 RX ORDER — DICLOFENAC SODIUM 75 MG/1
75 TABLET, DELAYED RELEASE ORAL 2 TIMES DAILY
Qty: 60 TABLET | Refills: 5 | Status: SHIPPED | OUTPATIENT
Start: 2021-11-01 | End: 2022-09-06

## 2021-11-01 NOTE — PROGRESS NOTES
2021\  Patient Name: Hans Bailey  : 1985  Medical Record: [id number]    MEDICATIONS  Current Outpatient Medications   Medication Sig Dispense Refill    diclofenac (VOLTAREN) 75 MG EC tablet Take 1 tablet by mouth 2 times daily 60 tablet 5    Adalimumab (HUMIRA) 40 MG/0.4ML PSKT INJECT 40 MG UNDER THE SKIN EVERY 7 DAYS. 4 each 5    FLUoxetine (PROZAC) 40 MG capsule Take 40 mg by mouth daily      Levonorgest-Eth Estrad -Day (CAMRESE) 0.15-0.03 &0.01 MG TABS Take  by mouth.  acetaminophen (TYLENOL) 325 MG tablet Take 650 mg by mouth every 6 hours as needed for Pain. No current facility-administered medications for this visit. ALLERGIES  Allergies   Allergen Reactions    Naproxen Nausea And Vomiting    Ampicillin     Metoclopramide Hives    Pcn [Penicillins]     Celebrex [Celecoxib] Nausea And Vomiting    Codeine Nausea And Vomiting         Comments  No specialty comments available. Background history:  Hans Bailey is a 31 y.o. female who is being seen for follow up evaluation of  rheumatoid arthritis. Patient is a transfer from Dr. Nehemiah Don. Dr. Donna Wade notes were reviewed in detail. She was diagnosed with rheumatoid arthritis 5 years ago. She was on [employer] which worked initially but lost its efficacy. She is also on methotrexate 8 pills per week, Plaquenil 200 mg once a day. She is on Humira 40 mg every week for past 1.5 years. She is complaining of intermittent pain and swelling in her knuckles, hips, neck and back. She has a morning stiffness lasting for 20 minutes to 45 minutes. She feels that her symptoms have recently worsened. Her pain and swelling last for few hours. She denies any recent fevers or infections. She is able to perform her ADLs. Interim History: She presents for follow-up of rheumatoid arthritis. On Humira 40 mg every week. She is getting on and off achiness in the joints especially in her hands.   She denies any swelling in the joints. Morning stiffness last for 20 minutes. She reports improvement with diclofenac. She is also on diclofenac 75 mg twice a day. She is off of methotrexate and Plaquenil due to nausea and vomiting. She denies any recent fevers or infections. She denies any other side effects with medications. Blood work reviewed shows positive RF, CCP, elevated ESR and CRP. HPI  ROS      REVIEW OF SYSTEMS: Dry eyes+, IBS, ANXIETY AND DEPRESSION   Constitutional: No unanticipated weight loss or fevers. No fatigue and malaise. Integumentary: No rash, photosensitivity, malar rash, livedo reticularis, alopecia and Raynaud's symptoms, sclerodactyly, skin tightening  Eyes: negative for  visual disturbance and persistent redness, discharge from eyes   ENT: - No tinnitus, loss of hearing, vertigo, or recurrent ear infections.  - No history of nasal/oral ulcers. Cardiovascular: No history of pericarditis, chest pain or murmur or palpitations  Respiratory: No shortness of breath, cough or history of interstitial lung disease. No history of pleurisy. No history of tuberculosis or atypical infections. Gastrointestinal: No history of heart burn, dysphagia or esophageal dysmotility. No inflammatory bowel disease. Genitourinary: No history renal disease, miscarriages. Hematologic/Lymphatic: No abnormal bruising or bleeding, blood clots or swollen lymph nodes. Neurological: No history seizure or focal weakness. No history of neuropathies, paresthesias or hyperesthesias, facial droop, diplopia  Psychiatric: No history of bipolar disease  Endocrine: Denies any thyroid / parathyroid disease and osteoporosis  Allergic/Immunologic: No nasal congestion or hives. I have reviewed patients Past medical History, Social History and Family History as mentioned in her chart and this remains unchanged from previous.     Past Medical History:   Diagnosis Date    Anemia     Endometriosis     IBS (irritable bowel syndrome)     [facility] ([facility]      Past Surgical History:   Procedure Laterality Date    LAPAROSCOPY      Endometriosis x2     Social History     Socioeconomic History    Marital status:      Spouse name: Not on file    Number of children: Not on file    Years of education: Not on file    Highest education level: Not on file   Occupational History    Occupation: teacher   Tobacco Use    Smoking status: Never Smoker    Smokeless tobacco: Never Used   Vaping Use    Vaping Use: Never used   Substance and Sexual Activity    Alcohol use: No     Alcohol/week: 2.0 standard drinks     Types: 2 Cans of beer per week    Drug use: No    Sexual activity: Not on file   Other Topics Concern    Not on file   Social History Narrative    Not on file     Social Determinants of Health     Financial Resource Strain:     Difficulty of Paying Living Expenses:    Food Insecurity:     Worried About Running Out of Food in the Last Year:     Ran Out of Food in the Last Year:    Transportation Needs:     Lack of Transportation (Medical):      Lack of Transportation (Non-Medical):    Physical Activity:     Days of Exercise per Week:     Minutes of Exercise per Session:    Stress:     Feeling of Stress :    Social Connections:     Frequency of Communication with Friends and Family:     Frequency of Social Gatherings with Friends and Family:     Attends Sikh Services:     Active Member of Clubs or Organizations:     Attends Club or Organization Meetings:     Marital Status:    Intimate Partner Violence:     Fear of Current or Ex-Partner:     Emotionally Abused:     Physically Abused:     Sexually Abused:      Family History   Problem Relation Age of Onset    Cancer Mother     Arthritis Mother     Lupus Mother     High Blood Pressure Father     Stroke Maternal Grandmother     Heart Disease Maternal Grandmother     Arthritis Maternal Grandmother     Heart Disease Maternal Grandfather     Heart Disease Paternal Grandmother        PHYSICAL EXAMINATION:    Vitals:    11/01/21 1531   BP: 120/68   Pulse: 60   Weight: 194 lb 12.8 oz (88.4 kg)         Physical Exam  Constitutional:  Well developed, well nourished, no acute distress, non-toxic appearance   Musculoskeletal:    RIGHT  Swell  Tender  ROM  LEFT  Swell  Tender  ROM    DIP2  0  0  FULL   0  0  FULL    DIP3  0  0  FULL   0  0  FULL    DIP4  0  0  FULL   0  0  FULL    DIP5  0  0  FULL   0  0  FULL    PIP1  0  0  FULL   0  0 FULL    PIP2  0  0  FULL   0  0  FULL    PIP3  0  0  FULL   0  0  FULL    PIP4  0  0  FULL   0  0 FULL    PIP5  0  0  FULL   0  0  FULL    MCP1  0  0  FULL   0  0  FULL    MCP2  0  0  FULL   0  0  FULL    MCP3  0  0  FULL   0  0  FULL    MCP4  0  0  FULL   0  0  FULL    MCP5  0  0  FULL   0  0  FULL    Wrist  0  0  FULL   0  0  FULL    Elbow  0  0  FULL   0  0  FULL    Shouldr  0  0  FULL   0  0  FULL    Hip  0  0  FULL   0  0  FULL    Knee  0  0   crepitus   0  0   crepitus    Ankle  0  0  FULL   0  0  FULL    MTP1  0  0  FULL   0  0  FULL    MTP2  0  0  FULL   0  0  FULL    MTP3  0  0  FULL   0  0  FULL    MTP4  0  0  FULL   0  0  FULL    MTP5  0  0  FULL   0  0  FULL    IP1  0  0  FULL   0  0  FULL    IP2  0  0  FULL   0  0  FULL    IP3  0  0  FULL   0  0  FULL    IP4  0  0  FULL   0  0  FULL    IP5  0  0  FULL   0 0 FULL     Ambulates without assistance, normal gait  Back- no tenderness. Eyes:  PERRL, extra ocular movements intact, conjunctiva normal   HEENT:  Atraumatic, normocephalic, external ears normal, oropharynx moist, no pharyngeal exudates. Respiratory:  No respiratory distress  GI:  Soft, nondistended, normal bowel sounds, nontender, no organomegaly, no mass, no rebound, no guarding   :  No costovertebral angle tenderness   Integument:  Well hydrated, no rash or telangiectasias  Lymphatic:  No lymphadenopathy noted   Neurologic:   Alert & oriented x 3, CN 2-12 normal, no focal deficits noted. Sensations Intact. Muscle strength 5/5 proximally and distally in upper and lower extremities. Psychiatric:  Speech and behavior appropriate           LABS AND IMAGING    Lab Results   Component Value Date    WBC 9.2 07/30/2021    HGB 13.5 07/30/2021    HCT 39.7 07/30/2021    MCV 88.8 07/30/2021     07/30/2021    LYMPHOPCT 30.6 07/30/2021    RBC 4.47 07/30/2021    MCH 30.2 07/30/2021    MCHC 34.0 07/30/2021    RDW 13.4 07/30/2021     Lab Results   Component Value Date    CREATININE 0.7 07/30/2021    BUN 12 12/30/2020     12/30/2020    K 4.2 12/30/2020     12/30/2020    CO2 24 12/30/2020     Lab Results   Component Value Date    ALT 19 07/30/2021    AST 17 07/30/2021    ALKPHOS 49 12/30/2020    BILITOT 0.3 12/30/2020       No images are attached to the encounter or orders placed in the encounter. 10/29/2021  Bilateral hand and foot x-rays-no evidence of degenerative arthritis or erosions    ASSESSMENT:    1. Rheumatoid arthritis involving multiple sites with positive rheumatoid factor ([facility] [address]    2. Maintenance chemotherapy        PLAN:   1. Rheumatoid arthritis involving multiple sites with positive rheumatoid factor (HCC)  RF, CCP positive, no erosions on hand and foot x-rays  No active synovitis on joint exam  Continue Humira 40 mg subcu every week diclofenac 75 mg twice a day  -Off of methotrexate and Plaquenil due to nausea and vomiting, okay to hold      2. Maintenance chemotherapy  Labs reviewed. Declines flu and pneumonia vaccine    The patient indicates understanding of these issues and agrees with the plan. Return in about 6 months (around 5/1/2022). The risks and benefits of my recommendations, as well as other treatment options, benefits and side effects were discussed with the patient. All questions were answered. ######################################################################    I thank you for giving me the opportunity to participate in [facility].  If you have any questions or concerns please feel free to contact me. I look forward to following  Jose Nayak along with you. Electronically signed by: Shane Robles MD, MD, 11/1/2021 3:48 PM    Documentation was done using voice recognition dragon software. Every effort was made to ensure accuracy; however, inadvertent unintentional computerized transcription errors may be present.

## 2021-12-27 ENCOUNTER — TELEPHONE (OUTPATIENT)
Dept: RHEUMATOLOGY | Age: 36
End: 2021-12-27

## 2021-12-27 NOTE — TELEPHONE ENCOUNTER
PA COVER MY MEDS  Medication:Humira (CF) 40MG/0.4ML syringe kit  Key:[id number] - PA Case ID: [id number]  Status:Approved today  CaseId:[id number];Status:Approved; Review Type:Prior Auth; Coverage Start Date:11/27/2021; Coverage End Date:12/27/2022;;

## 2022-05-30 DIAGNOSIS — M05.79 RHEUMATOID ARTHRITIS INVOLVING MULTIPLE SITES WITH POSITIVE RHEUMATOID FACTOR (HCC): ICD-10-CM

## 2022-05-31 RX ORDER — FOLIC ACID 1 MG/1
TABLET ORAL
Qty: 90 TABLET | Refills: 3 | OUTPATIENT
Start: 2022-05-31

## 2022-07-21 DIAGNOSIS — M05.79 RHEUMATOID ARTHRITIS INVOLVING MULTIPLE SITES WITH POSITIVE RHEUMATOID FACTOR (HCC): Chronic | ICD-10-CM

## 2022-07-21 RX ORDER — ADALIMUMAB 40MG/0.4ML
KIT SUBCUTANEOUS
Qty: 4 EACH | Refills: 5 | OUTPATIENT
Start: 2022-07-21

## 2022-09-06 ENCOUNTER — OFFICE VISIT (OUTPATIENT)
Dept: RHEUMATOLOGY | Age: 37
End: 2022-09-06
Payer: COMMERCIAL

## 2022-09-06 VITALS
DIASTOLIC BLOOD PRESSURE: 80 MMHG | BODY MASS INDEX: 33.88 KG/M2 | SYSTOLIC BLOOD PRESSURE: 102 MMHG | WEIGHT: 191.2 LBS | HEIGHT: 63 IN

## 2022-09-06 DIAGNOSIS — M05.79 RHEUMATOID ARTHRITIS INVOLVING MULTIPLE SITES WITH POSITIVE RHEUMATOID FACTOR (HCC): Primary | Chronic | ICD-10-CM

## 2022-09-06 DIAGNOSIS — Z51.11 MAINTENANCE CHEMOTHERAPY: ICD-10-CM

## 2022-09-06 DIAGNOSIS — M15.9 PRIMARY OSTEOARTHRITIS INVOLVING MULTIPLE JOINTS: ICD-10-CM

## 2022-09-06 LAB
ALT SERPL-CCNC: 17 U/L (ref 10–40)
AST SERPL-CCNC: 14 U/L (ref 15–37)
BASOPHILS ABSOLUTE: 0 K/UL (ref 0–0.2)
BASOPHILS RELATIVE PERCENT: 0.3 %
CREAT SERPL-MCNC: 0.7 MG/DL (ref 0.6–1.1)
EOSINOPHILS ABSOLUTE: 0.2 K/UL (ref 0–0.6)
EOSINOPHILS RELATIVE PERCENT: 2.8 %
GFR AFRICAN AMERICAN: >60
GFR NON-AFRICAN AMERICAN: >60
HCT VFR BLD CALC: 37.2 % (ref 36–48)
HEMOGLOBIN: 12.6 G/DL (ref 12–16)
LYMPHOCYTES ABSOLUTE: 2.3 K/UL (ref 1–5.1)
LYMPHOCYTES RELATIVE PERCENT: 35 %
MCH RBC QN AUTO: 29.3 PG (ref 26–34)
MCHC RBC AUTO-ENTMCNC: 33.8 G/DL (ref 31–36)
MCV RBC AUTO: 86.8 FL (ref 80–100)
MONOCYTES ABSOLUTE: 0.4 K/UL (ref 0–1.3)
MONOCYTES RELATIVE PERCENT: 6.6 %
NEUTROPHILS ABSOLUTE: 3.6 K/UL (ref 1.7–7.7)
NEUTROPHILS RELATIVE PERCENT: 55.3 %
PDW BLD-RTO: 14.1 % (ref 12.4–15.4)
PLATELET # BLD: 241 K/UL (ref 135–450)
PMV BLD AUTO: 8.5 FL (ref 5–10.5)
RBC # BLD: 4.28 M/UL (ref 4–5.2)
WBC # BLD: 6.6 K/UL (ref 4–11)

## 2022-09-06 PROCEDURE — 99214 OFFICE O/P EST MOD 30 MIN: CPT | Performed by: INTERNAL MEDICINE

## 2022-09-06 RX ORDER — ADALIMUMAB 40MG/0.4ML
KIT SUBCUTANEOUS
Qty: 4 EACH | Refills: 5 | Status: SHIPPED | OUTPATIENT
Start: 2022-09-06

## 2022-09-06 NOTE — PROGRESS NOTES
2022\  Patient Name: Chantel Álvarez  : 1985  Medical Record: [id number]    MEDICATIONS  Current Outpatient Medications   Medication Sig Dispense Refill    Adalimumab (HUMIRA) 40 MG/0.4ML PSKT INJECT 40 MG UNDER THE SKIN EVERY 7 DAYS. 4 each 5    FLUoxetine (PROZAC) 40 MG capsule Take 40 mg by mouth daily      Levonorgest-Eth Estrad -Day 0.15-0.03 &0.01 MG TABS Take  by mouth. acetaminophen (TYLENOL) 325 MG tablet Take 650 mg by mouth every 6 hours as needed for Pain. No current facility-administered medications for this visit. ALLERGIES  Allergies   Allergen Reactions    Naproxen Nausea And Vomiting    Ampicillin     Metoclopramide Hives    Pcn [Penicillins]     Celebrex [Celecoxib] Nausea And Vomiting    Codeine Nausea And Vomiting         Comments  No specialty comments available. Background history:  Chantel Álvarez is a 30 y.o. female who is being seen for follow up evaluation of  rheumatoid arthritis. Patient is a transfer from Dr. Shreyas Moore. Dr. Jl Cunningham notes were reviewed in detail. She was diagnosed with rheumatoid arthritis 5 years ago. She was on Willistine Read which worked initially but lost its efficacy. She is also on methotrexate 8 pills per week, Plaquenil 200 mg once a day. She is on Humira 40 mg every week for past 1.5 years. She is complaining of intermittent pain and swelling in her knuckles, hips, neck and back. She has a morning stiffness lasting for 20 minutes to 45 minutes. She feels that her symptoms have recently worsened. Her pain and swelling last for few hours. She denies any recent fevers or infections. She is able to perform her ADLs. Interim History: She presents for follow-up of rheumatoid arthritis. She is noncompliant with follow-ups. She has been off of work for past 4 weeks. She reports mild worsening of joint pain off of Humira especially in her hands and feet. She denies any swelling in the joints.   She is not taking diclofenac anymore. She is off of methotrexate and Plaquenil due to nausea and vomiting. She denies any recent fevers or infections. She denies any other side effects with medications. Blood work reviewed shows positive RF, CCP, elevated ESR and CRP. HPI  ROS      REVIEW OF SYSTEMS: Dry eyes+, IBS, ANXIETY AND DEPRESSION   Constitutional: No unanticipated weight loss or fevers. No fatigue and malaise. Integumentary: No rash, photosensitivity, malar rash, livedo reticularis, alopecia and Raynaud's symptoms, sclerodactyly, skin tightening  Eyes: negative for  visual disturbance and persistent redness, discharge from eyes   ENT: - No tinnitus, loss of hearing, vertigo, or recurrent ear infections.  - No history of nasal/oral ulcers. Cardiovascular: No history of pericarditis, chest pain or murmur or palpitations  Respiratory: No shortness of breath, cough or history of interstitial lung disease. No history of pleurisy. No history of tuberculosis or atypical infections. Gastrointestinal: No history of heart burn, dysphagia or esophageal dysmotility. No inflammatory bowel disease. Genitourinary: No history renal disease, miscarriages. Hematologic/Lymphatic: No abnormal bruising or bleeding, blood clots or swollen lymph nodes. Neurological: No history seizure or focal weakness. No history of neuropathies, paresthesias or hyperesthesias, facial droop, diplopia  Psychiatric: No history of bipolar disease  Endocrine: Denies any thyroid / parathyroid disease and osteoporosis  Allergic/Immunologic: No nasal congestion or hives. I have reviewed patients Past medical History, Social History and Family History as mentioned in her chart and this remains unchanged from previous.     Past Medical History:   Diagnosis Date    Anemia     Endometriosis     IBS (irritable bowel syndrome)     Rheumatoid arthritis ([facility] [address]      Past Surgical History:   Procedure Laterality Date    LAPAROSCOPY      Endometriosis x2     Social History     Socioeconomic History    Marital status:      Spouse name: Not on file    Number of children: Not on file    Years of education: Not on file    Highest education level: Not on file   Occupational History    Occupation: teacher   Tobacco Use    Smoking status: Never    Smokeless tobacco: Never   Vaping Use    Vaping Use: Never used   Substance and Sexual Activity    Alcohol use: No     Alcohol/week: 2.0 standard drinks     Types: 2 Cans of beer per week    Drug use: No    Sexual activity: Not on file   Other Topics Concern    Not on file   Social History Narrative    Not on file     Social Determinants of Health     Financial Resource Strain: Not on file   Food Insecurity: Not on file   Transportation Needs: Not on file   Physical Activity: Not on file   Stress: Not on file   Social Connections: Not on file   Intimate Partner Violence: Not on file   Housing Stability: Not on file     Family History   Problem Relation Age of Onset    Cancer Mother     Arthritis Mother     Lupus Mother     High Blood Pressure Father     Stroke Maternal Grandmother     Heart Disease Maternal Grandmother     Arthritis Maternal Grandmother     Heart Disease Maternal Grandfather     Heart Disease Paternal Grandmother        PHYSICAL EXAMINATION:    Vitals:    09/06/22 0932   BP: 102/80   Site: Right Upper Arm   Position: Sitting   Cuff Size: Medium Adult   Weight: 191 lb 3.2 oz (86.7 kg)   Height: 5' 3\" (1.6 m)         Physical Exam  Constitutional:  Well developed, well nourished, no acute distress, non-toxic appearance   Musculoskeletal:    RIGHT  Swell  Tender  ROM  LEFT  Swell  Tender  ROM    DIP2  0  0  FULL   0  0  FULL    DIP3  0  0  FULL   0  0  FULL    DIP4  0  0  FULL   0  0  FULL    DIP5  0  0  FULL   0  0  FULL    PIP1  0  0  FULL   0  0 FULL    PIP2  0  0  FULL   0  0  FULL    PIP3  0  0  FULL   0  0  FULL    PIP4  0  0  FULL   0  0 FULL    PIP5  0  0  FULL   0  0  FULL    MCP1  0  0  FULL   0  0  FULL MCP2  0  0  FULL   0  0  FULL    MCP3  0  0  FULL   0  0  FULL    MCP4  0  0  FULL   0  0  FULL    MCP5  0  0  FULL   0  0  FULL    Wrist  0  0  FULL   0  0  FULL    Elbow  0  0  FULL   0  0  FULL    Shouldr  0  0  FULL   0  0  FULL    Hip  0  0  FULL   0  0  FULL    Knee  0  0   crepitus   0  0   crepitus    Ankle  0  0  FULL   0  0  FULL    MTP1  0  0  FULL   0  0  FULL    MTP2  0  0  FULL   0  0  FULL    MTP3  0  0  FULL   0  0  FULL    MTP4  0  0  FULL   0  0  FULL    MTP5  0  0  FULL   0  0  FULL    IP1  0  0  FULL   0  0  FULL    IP2  0  0  FULL   0  0  FULL    IP3  0  0  FULL   0  0  FULL    IP4  0  0  FULL   0  0  FULL    IP5  0  0  FULL   0 0 FULL     Ambulates without assistance, normal gait  Back- no tenderness. Eyes:  PERRL, extra ocular movements intact, conjunctiva normal   HEENT:  Atraumatic, normocephalic, external ears normal, oropharynx moist, no pharyngeal exudates. Respiratory:  No respiratory distress  GI:  Soft, nondistended, normal bowel sounds, nontender, no organomegaly, no mass, no rebound, no guarding   :  No costovertebral angle tenderness   Integument:  Well hydrated, no rash or telangiectasias  Lymphatic:  No lymphadenopathy noted   Neurologic:   Alert & oriented x 3, CN 2-12 normal, no focal deficits noted. Sensations Intact. Muscle strength 5/5 proximally and distally in upper and lower extremities.    Psychiatric:  Speech and behavior appropriate           LABS AND IMAGING    Lab Results   Component Value Date    WBC 8.4 11/01/2021    HGB 13.6 11/01/2021    HCT 41.1 11/01/2021    MCV 89.4 11/01/2021     11/01/2021    LYMPHOPCT 37.8 11/01/2021    RBC 4.59 11/01/2021    MCH 29.5 11/01/2021    MCHC 33.1 11/01/2021    RDW 13.4 11/01/2021     Lab Results   Component Value Date    CREATININE 0.6 11/01/2021    BUN 11 11/01/2021     11/01/2021    K 4.3 11/01/2021     11/01/2021    CO2 22 11/01/2021     Lab Results   Component Value Date    ALT 32 11/01/2021    AST 32 11/01/2021    ALKPHOS 61 11/01/2021    BILITOT <0.2 11/01/2021       No images are attached to the encounter or orders placed in the encounter. 10/29/2021  Bilateral hand and foot x-rays-no evidence of degenerative arthritis or erosions    ASSESSMENT:    1. Rheumatoid arthritis involving multiple sites with positive rheumatoid factor ([address]    2. Maintenance chemotherapy    3. Primary osteoarthritis involving multiple joints        PLAN:    Diagnosis Orders   1. Rheumatoid arthritis involving multiple sites with positive rheumatoid factor (HCC)  Adalimumab (HUMIRA) 40 MG/0.4ML PSKT      2. Maintenance chemotherapy  ALT    AST    CBC with Auto Differential    Creatinine      3. Primary osteoarthritis involving multiple joints           1. Rheumatoid arthritis involving multiple sites with positive rheumatoid factor (HCC)  RF, CCP positive, no erosions on hand and foot x-rays  No active synovitis on joint exam  Restart Humira 40 mg subcu every week     -Off of methotrexate and Plaquenil due to nausea and vomiting, okay to hold      2. Maintenance chemotherapy  Labs reviewed. Declines flu and pneumonia vaccine    3. Primary osteoarthritis involving multiple joints  Stable. Off of diclofenac. The patient indicates understanding of these issues and agrees with the plan. Return in about 6 months (around 3/6/2023). The risks and benefits of my recommendations, as well as other treatment options, benefits and side effects were discussed with the patient. All questions were answered. ######################################################################    I thank you for giving me the opportunity to participate in [facility]. If you have any questions or concerns please feel free to contact me. I look forward to following  Daisy Oliver along with you. Electronically signed by: Jesus Helm MD, MD, 9/6/2022 9:59 AM    Documentation was done using voice recognition dragon software.   Every effort was made to ensure accuracy; however, inadvertent unintentional computerized transcription errors may be present.

## 2022-12-14 ENCOUNTER — TELEPHONE (OUTPATIENT)
Dept: RHEUMATOLOGY | Age: 37
End: 2022-12-14

## 2022-12-14 NOTE — TELEPHONE ENCOUNTER
Submitted PA for HUMIRA  Via CMM Key: [id number] STATUS: APPROVED FROM:      Approvedtoday  CaseId:[id number];Status:Approved; Review Type:Prior Auth; Coverage Start Date:11/14/2022; Coverage End Date:12/14/2023;

## 2023-02-12 DIAGNOSIS — M05.79 RHEUMATOID ARTHRITIS INVOLVING MULTIPLE SITES WITH POSITIVE RHEUMATOID FACTOR (HCC): Chronic | ICD-10-CM

## 2023-02-13 RX ORDER — ADALIMUMAB 40MG/0.4ML
KIT SUBCUTANEOUS
Qty: 4 EACH | Refills: 5 | Status: SHIPPED | OUTPATIENT
Start: 2023-02-13

## 2023-02-22 DIAGNOSIS — M05.79 RHEUMATOID ARTHRITIS INVOLVING MULTIPLE SITES WITH POSITIVE RHEUMATOID FACTOR (HCC): Chronic | ICD-10-CM

## 2023-02-22 RX ORDER — ADALIMUMAB 40MG/0.4ML
KIT SUBCUTANEOUS
Qty: 4 EACH | Refills: 5 | Status: SHIPPED | OUTPATIENT
Start: 2023-02-22